# Patient Record
Sex: FEMALE | Race: WHITE | NOT HISPANIC OR LATINO | URBAN - METROPOLITAN AREA
[De-identification: names, ages, dates, MRNs, and addresses within clinical notes are randomized per-mention and may not be internally consistent; named-entity substitution may affect disease eponyms.]

---

## 2017-02-08 ENCOUNTER — EMERGENCY (EMERGENCY)
Facility: HOSPITAL | Age: 27
LOS: 1 days | Discharge: HOME | End: 2017-02-08
Attending: EMERGENCY MEDICINE | Admitting: EMERGENCY MEDICINE
Payer: COMMERCIAL

## 2017-02-08 VITALS
SYSTOLIC BLOOD PRESSURE: 115 MMHG | DIASTOLIC BLOOD PRESSURE: 80 MMHG | OXYGEN SATURATION: 100 % | RESPIRATION RATE: 16 BRPM | TEMPERATURE: 98 F | HEART RATE: 49 BPM

## 2017-02-08 VITALS
SYSTOLIC BLOOD PRESSURE: 118 MMHG | HEART RATE: 82 BPM | DIASTOLIC BLOOD PRESSURE: 81 MMHG | TEMPERATURE: 98 F | OXYGEN SATURATION: 100 % | RESPIRATION RATE: 22 BRPM

## 2017-02-08 LAB
ALBUMIN SERPL ELPH-MCNC: 5 G/DL — SIGNIFICANT CHANGE UP (ref 3.3–5)
ALP SERPL-CCNC: 76 U/L — SIGNIFICANT CHANGE UP (ref 40–120)
ALT FLD-CCNC: 21 U/L — SIGNIFICANT CHANGE UP (ref 4–33)
AST SERPL-CCNC: 22 U/L — SIGNIFICANT CHANGE UP (ref 4–32)
BASOPHILS # BLD AUTO: 0.02 K/UL — SIGNIFICANT CHANGE UP (ref 0–0.2)
BASOPHILS NFR BLD AUTO: 0.2 % — SIGNIFICANT CHANGE UP (ref 0–2)
BILIRUB SERPL-MCNC: 0.2 MG/DL — SIGNIFICANT CHANGE UP (ref 0.2–1.2)
BUN SERPL-MCNC: 7 MG/DL — SIGNIFICANT CHANGE UP (ref 7–23)
CALCIUM SERPL-MCNC: 9.8 MG/DL — SIGNIFICANT CHANGE UP (ref 8.4–10.5)
CHLORIDE SERPL-SCNC: 101 MMOL/L — SIGNIFICANT CHANGE UP (ref 98–107)
CO2 SERPL-SCNC: 25 MMOL/L — SIGNIFICANT CHANGE UP (ref 22–31)
CREAT SERPL-MCNC: 0.7 MG/DL — SIGNIFICANT CHANGE UP (ref 0.5–1.3)
D DIMER BLD IA.RAPID-MCNC: < 150 NG/ML — SIGNIFICANT CHANGE UP
EOSINOPHIL # BLD AUTO: 0.29 K/UL — SIGNIFICANT CHANGE UP (ref 0–0.5)
EOSINOPHIL NFR BLD AUTO: 2.9 % — SIGNIFICANT CHANGE UP (ref 0–6)
GLUCOSE SERPL-MCNC: 106 MG/DL — HIGH (ref 70–99)
HCT VFR BLD CALC: 39.9 % — SIGNIFICANT CHANGE UP (ref 34.5–45)
HGB BLD-MCNC: 13 G/DL — SIGNIFICANT CHANGE UP (ref 11.5–15.5)
IMM GRANULOCYTES NFR BLD AUTO: 0.1 % — SIGNIFICANT CHANGE UP (ref 0–1.5)
LYMPHOCYTES # BLD AUTO: 2.23 K/UL — SIGNIFICANT CHANGE UP (ref 1–3.3)
LYMPHOCYTES # BLD AUTO: 22.6 % — SIGNIFICANT CHANGE UP (ref 13–44)
MCHC RBC-ENTMCNC: 27 PG — SIGNIFICANT CHANGE UP (ref 27–34)
MCHC RBC-ENTMCNC: 32.6 % — SIGNIFICANT CHANGE UP (ref 32–36)
MCV RBC AUTO: 83 FL — SIGNIFICANT CHANGE UP (ref 80–100)
MONOCYTES # BLD AUTO: 0.67 K/UL — SIGNIFICANT CHANGE UP (ref 0–0.9)
MONOCYTES NFR BLD AUTO: 6.8 % — SIGNIFICANT CHANGE UP (ref 2–14)
NEUTROPHILS # BLD AUTO: 6.64 K/UL — SIGNIFICANT CHANGE UP (ref 1.8–7.4)
NEUTROPHILS NFR BLD AUTO: 67.4 % — SIGNIFICANT CHANGE UP (ref 43–77)
PLATELET # BLD AUTO: 303 K/UL — SIGNIFICANT CHANGE UP (ref 150–400)
PMV BLD: 11.8 FL — SIGNIFICANT CHANGE UP (ref 7–13)
POTASSIUM SERPL-MCNC: 3.8 MMOL/L — SIGNIFICANT CHANGE UP (ref 3.5–5.3)
POTASSIUM SERPL-SCNC: 3.8 MMOL/L — SIGNIFICANT CHANGE UP (ref 3.5–5.3)
PROT SERPL-MCNC: 8.3 G/DL — SIGNIFICANT CHANGE UP (ref 6–8.3)
RBC # BLD: 4.81 M/UL — SIGNIFICANT CHANGE UP (ref 3.8–5.2)
RBC # FLD: 13.4 % — SIGNIFICANT CHANGE UP (ref 10.3–14.5)
SODIUM SERPL-SCNC: 141 MMOL/L — SIGNIFICANT CHANGE UP (ref 135–145)
TROPONIN T SERPL-MCNC: < 0.06 NG/ML — SIGNIFICANT CHANGE UP (ref 0–0.06)
WBC # BLD: 9.86 K/UL — SIGNIFICANT CHANGE UP (ref 3.8–10.5)
WBC # FLD AUTO: 9.86 K/UL — SIGNIFICANT CHANGE UP (ref 3.8–10.5)

## 2017-02-08 PROCEDURE — 99284 EMERGENCY DEPT VISIT MOD MDM: CPT

## 2017-02-08 PROCEDURE — 71020: CPT | Mod: 26

## 2017-02-08 NOTE — ED PROVIDER NOTE - MUSCULOSKELETAL MINIMAL EXAM
pain is pleuritic on exam, appears to be worse with certain movements, no worse when lying completely flat

## 2017-02-08 NOTE — ED PROVIDER NOTE - VASCULAR COMPROMISE
nl pulses in carotids/UE/pulses full and equal bilaterally/no vascular compromise nl pulses in carotids/UE/LE/no vascular compromise/pulses full and equal bilaterally

## 2017-02-08 NOTE — ED PROVIDER NOTE - OBJECTIVE STATEMENT
25yo F with no significant PMHx, presents to ED c/o intermittent severe sharp CP, LUE tingling/soreness, SOB since yesterday night. Pt states she woke up with CP yesterday which then resolved and returned x3hrs ago while pt was at work. She notes pain was initially slight then worsened and is now radiating to her L chest. Pain is worse when straightening her back/breathing in/lying down/moving, alleviated when leaning forward. She took ASA and Tylenol for pain, ASA last taken a few hours ago. Pt was seen at [facility] and sent to ED. Pt states she has a stressful job. She had a cold x2wks ago with mild cough. Denies prior similar pain. LNMP x1wk ago, pt has regular periods, not on birth control. FHx of DM (brother). Denies other complaints, recent travels. NKDA.

## 2017-02-08 NOTE — ED ADULT TRIAGE NOTE - CHIEF COMPLAINT QUOTE
Pt arrives w/ c/o chest pain that occurred one time last night that began again this evening. Rpts pain increases with deep inspiration. Denies nausea or vomiting.

## 2017-02-08 NOTE — ED PROCEDURE NOTE - PROCEDURE ADDITIONAL DETAILS
no pericardial effusion  nl LV size and function  grossly nl RV size and function  no pericardial effusion seen    nl aortic span at base  no AI appreciated   fractional shortening= 29%   TAPSE= 23.6mm

## 2017-02-08 NOTE — ED ADULT NURSE NOTE - OBJECTIVE STATEMENT
Pt received as QUID pt per MD Rodriguez. Pt complaining of 7/10 sharp chest pain at rest worsening to 10/10 sharp chest pain on inspiration. Pt reports she had this pain last night, states 'it woke me up but then it went away'. Pt states the pain returned at around 6pm tonight and has not gone away. Pt reports pain is in the center of the chest radiating towards the L side and her L arm is tingling. Pt denies SOB. Pt appears restless. Pt states 'I have a really stressful job, maybe this is just an anxiety attack'. Skin warm, dry, intact, appropriate for race, no diaphoresis observed. EKG done showing NSR. VS documented per flow, 20g PIV placed in R AC, labs drawn and sent per orders. Medicated per orders, visitor at bedside. Safety maintained.

## 2017-02-08 NOTE — ED PROVIDER NOTE - PROGRESS NOTE DETAILS
pt improved with percocet  still present with deep breath lt sternal  pain also reproduced with palpation (point tender)     Pt now relates she had picked up heavy box shortly before pain began today   Pain remained in same spot  did not migrate to shoulder or back Cristopher Sanchez: Pt signed out to me by cristopher yepez at 2200. Plan to follow up labs and xray. all wnl. Pt feeling better and no pain at this time. Pt stable for discharge.

## 2017-02-08 NOTE — ED PROVIDER NOTE - MEDICAL DECISION MAKING DETAILS
Pt with new onset of L sided pleuritic CP, no cardiac risks, no PE risks by Hx. Pain is pleuritic possibly positional, difficulty to tell from exam. EKG is nondiagnostic, nonspecific T wave changes. Check CXR, labs, give pain control. Pt with new onset of L sided pleuritic CP, no cardiac risks, no PE risks by Hx. Pain is pleuritic possibly positional, difficulty to tell from exam. EKG is nondiagnostic, nonspecific T wave changes. not c/w ischemia Check CXR, labs, give pain control.

## 2017-02-08 NOTE — ED PROVIDER NOTE - NS ED MD SCRIBE ATTENDING SCRIBE SECTIONS
PAST MEDICAL/SURGICAL/SOCIAL HISTORY/HISTORY OF PRESENT ILLNESS/REVIEW OF SYSTEMS/PHYSICAL EXAM/HIV/VITAL SIGNS( Pullset)/DISPOSITION

## 2021-01-25 ENCOUNTER — APPOINTMENT (OUTPATIENT)
Dept: ANTEPARTUM | Facility: CLINIC | Age: 31
End: 2021-01-25
Payer: MEDICAID

## 2021-01-25 ENCOUNTER — ASOB RESULT (OUTPATIENT)
Age: 31
End: 2021-01-25

## 2021-01-25 PROBLEM — Z00.00 ENCOUNTER FOR PREVENTIVE HEALTH EXAMINATION: Status: ACTIVE | Noted: 2021-01-25

## 2021-01-25 PROCEDURE — 99072 ADDL SUPL MATRL&STAF TM PHE: CPT

## 2021-01-25 PROCEDURE — 76805 OB US >/= 14 WKS SNGL FETUS: CPT

## 2021-01-25 PROCEDURE — 99241 OFFICE CONSULTATION NEW/ESTAB PATIENT 15 MIN: CPT | Mod: 25

## 2021-02-02 ENCOUNTER — ASOB RESULT (OUTPATIENT)
Age: 31
End: 2021-02-02

## 2021-02-02 ENCOUNTER — APPOINTMENT (OUTPATIENT)
Dept: ANTEPARTUM | Facility: CLINIC | Age: 31
End: 2021-02-02
Payer: MEDICAID

## 2021-02-02 PROCEDURE — 76816 OB US FOLLOW-UP PER FETUS: CPT

## 2021-02-02 PROCEDURE — 99072 ADDL SUPL MATRL&STAF TM PHE: CPT

## 2021-03-16 ENCOUNTER — APPOINTMENT (OUTPATIENT)
Dept: ANTEPARTUM | Facility: CLINIC | Age: 31
End: 2021-03-16

## 2021-06-06 ENCOUNTER — OUTPATIENT (OUTPATIENT)
Dept: OUTPATIENT SERVICES | Facility: HOSPITAL | Age: 31
LOS: 1 days | End: 2021-06-06
Payer: COMMERCIAL

## 2021-06-06 DIAGNOSIS — Z11.52 ENCOUNTER FOR SCREENING FOR COVID-19: ICD-10-CM

## 2021-06-06 LAB — SARS-COV-2 RNA SPEC QL NAA+PROBE: SIGNIFICANT CHANGE UP

## 2021-06-06 PROCEDURE — U0003: CPT

## 2021-06-06 PROCEDURE — U0005: CPT

## 2021-06-06 PROCEDURE — C9803: CPT

## 2021-06-09 ENCOUNTER — TRANSCRIPTION ENCOUNTER (OUTPATIENT)
Age: 31
End: 2021-06-09

## 2021-06-09 ENCOUNTER — INPATIENT (INPATIENT)
Facility: HOSPITAL | Age: 31
LOS: 2 days | Discharge: HOME | End: 2021-06-12
Attending: OBSTETRICS & GYNECOLOGY | Admitting: OBSTETRICS & GYNECOLOGY
Payer: COMMERCIAL

## 2021-06-09 VITALS
SYSTOLIC BLOOD PRESSURE: 114 MMHG | RESPIRATION RATE: 16 BRPM | HEART RATE: 100 BPM | TEMPERATURE: 99 F | DIASTOLIC BLOOD PRESSURE: 71 MMHG

## 2021-06-09 DIAGNOSIS — O99.513 DISEASES OF THE RESPIRATORY SYSTEM COMPLICATING PREGNANCY, THIRD TRIMESTER: ICD-10-CM

## 2021-06-09 DIAGNOSIS — O35.8XX4: ICD-10-CM

## 2021-06-09 LAB
BASOPHILS # BLD AUTO: 0.04 K/UL — SIGNIFICANT CHANGE UP (ref 0–0.2)
BASOPHILS NFR BLD AUTO: 0.4 % — SIGNIFICANT CHANGE UP (ref 0–2)
BLD GP AB SCN SERPL QL: NEGATIVE — SIGNIFICANT CHANGE UP
COVID-19 SPIKE DOMAIN AB INTERP: POSITIVE
COVID-19 SPIKE DOMAIN ANTIBODY RESULT: 47.4 U/ML — HIGH
EOSINOPHIL # BLD AUTO: 0.14 K/UL — SIGNIFICANT CHANGE UP (ref 0–0.5)
EOSINOPHIL NFR BLD AUTO: 1.2 % — SIGNIFICANT CHANGE UP (ref 0–6)
HCT VFR BLD CALC: 34.9 % — SIGNIFICANT CHANGE UP (ref 34.5–45)
HGB BLD-MCNC: 11.4 G/DL — LOW (ref 11.5–15.5)
IMM GRANULOCYTES NFR BLD AUTO: 1.3 % — SIGNIFICANT CHANGE UP (ref 0–1.5)
LYMPHOCYTES # BLD AUTO: 1.56 K/UL — SIGNIFICANT CHANGE UP (ref 1–3.3)
LYMPHOCYTES # BLD AUTO: 13.7 % — SIGNIFICANT CHANGE UP (ref 13–44)
MCHC RBC-ENTMCNC: 28.9 PG — SIGNIFICANT CHANGE UP (ref 27–34)
MCHC RBC-ENTMCNC: 32.7 GM/DL — SIGNIFICANT CHANGE UP (ref 32–36)
MCV RBC AUTO: 88.6 FL — SIGNIFICANT CHANGE UP (ref 80–100)
MONOCYTES # BLD AUTO: 0.72 K/UL — SIGNIFICANT CHANGE UP (ref 0–0.9)
MONOCYTES NFR BLD AUTO: 6.3 % — SIGNIFICANT CHANGE UP (ref 2–14)
NEUTROPHILS # BLD AUTO: 8.77 K/UL — HIGH (ref 1.8–7.4)
NEUTROPHILS NFR BLD AUTO: 77.1 % — HIGH (ref 43–77)
NRBC # BLD: 0 /100 WBCS — SIGNIFICANT CHANGE UP (ref 0–0)
PLATELET # BLD AUTO: 225 K/UL — SIGNIFICANT CHANGE UP (ref 150–400)
RBC # BLD: 3.94 M/UL — SIGNIFICANT CHANGE UP (ref 3.8–5.2)
RBC # FLD: 14.1 % — SIGNIFICANT CHANGE UP (ref 10.3–14.5)
RH IG SCN BLD-IMP: POSITIVE — SIGNIFICANT CHANGE UP
RH IG SCN BLD-IMP: POSITIVE — SIGNIFICANT CHANGE UP
SARS-COV-2 IGG+IGM SERPL QL IA: 47.4 U/ML — HIGH
SARS-COV-2 IGG+IGM SERPL QL IA: POSITIVE
T PALLIDUM AB TITR SER: NEGATIVE — SIGNIFICANT CHANGE UP
WBC # BLD: 11.38 K/UL — HIGH (ref 3.8–10.5)
WBC # FLD AUTO: 11.38 K/UL — HIGH (ref 3.8–10.5)

## 2021-06-09 RX ORDER — SODIUM CHLORIDE 9 MG/ML
1000 INJECTION, SOLUTION INTRAVENOUS
Refills: 0 | Status: DISCONTINUED | OUTPATIENT
Start: 2021-06-09 | End: 2021-06-10

## 2021-06-09 RX ORDER — OXYTOCIN 10 UNIT/ML
4 VIAL (ML) INJECTION
Qty: 30 | Refills: 0 | Status: DISCONTINUED | OUTPATIENT
Start: 2021-06-09 | End: 2021-06-10

## 2021-06-09 RX ORDER — OXYTOCIN 10 UNIT/ML
333.33 VIAL (ML) INJECTION
Qty: 20 | Refills: 0 | Status: DISCONTINUED | OUTPATIENT
Start: 2021-06-09 | End: 2021-06-10

## 2021-06-09 RX ORDER — CITRIC ACID/SODIUM CITRATE 300-500 MG
15 SOLUTION, ORAL ORAL EVERY 6 HOURS
Refills: 0 | Status: DISCONTINUED | OUTPATIENT
Start: 2021-06-09 | End: 2021-06-10

## 2021-06-09 RX ADMIN — Medication 4 MILLIUNIT(S)/MIN: at 15:37

## 2021-06-09 NOTE — OB PROVIDER H&P - HISTORY OF PRESENT ILLNESS
29 yo  @ 39.4 weeks presenting for elective induction of labor. pt denies reg cxns/lof/vb. pt reports pos fm. pnc uncomplicated. gbs neg efw by leopold [id number]  allergies- nkda  meds- PNV, fe  med hx- anemia.  allergy/exercise induced asthma. last exacerbation > 2 years ago. pt denies hospitalizations/intubations  pt denies cardiac/gi/neuro/psych  obhx- current no pnc complications.   gyn hx- denies cysts/fibroids/abnl pap/std/hpv  sx hx- denies  fam hx- brother DM, mother bt ca > 50 years old when diagnosed  social hx- etoh 2-3/week. pt denies etoh/tob/illicit drug use during pregnancy

## 2021-06-09 NOTE — OB PROVIDER H&P - ALERT: PERTINENT HISTORY
The patient was playing a game. The  just stopped by to say hello. Chaplains will remain available to offer spiritual and emotional support as needed. None

## 2021-06-09 NOTE — CHART NOTE - NSCHARTNOTEFT_GEN_A_CORE
Pt seen for placement of vag cyto  T(C): 37 (06-09-21 @ 06:50), Max: 37.0 (06-09-21 @ 06:14)  HR: 97 (06-09-21 @ 08:37) (93 - 108)  BP: 109/76 (06-09-21 @ 08:31) (109/76 - 114/71)  RR: 16 (06-09-21 @ 06:50) (16 - 16)  SpO2: 98% (06-09-21 @ 08:37) (97% - 100%)  EFM cat 1  TOCO no ctx  VE 0/50/-3  P0@39w4d, elective IOL  vag cyto 25 placed in posterior fornix  Ame Sweeney PAC

## 2021-06-09 NOTE — OB PROVIDER H&P - NSHPPHYSICALEXAM_GEN_ALL_CORE
pe  Vital Signs Last 24 Hrs  T(C): 37 (09 Jun 2021 06:50), Max: 37.0 (09 Jun 2021 06:14)  T(F): 98.6 (09 Jun 2021 06:50), Max: 98.6 (09 Jun 2021 06:14)  HR: 105 (09 Jun 2021 07:32) (99 - 108)  BP: 114/71 (09 Jun 2021 06:50) (114/71 - 114/71)  BP(mean): --  RR: 16 (09 Jun 2021 06:50) (16 - 16)  SpO2: 98% (09 Jun 2021 07:32) (98% - 100%)    gen- a&ox3 well developed well no urished nad  cv- rrr s1 s2 lungs cta bl  abd gravid soft  ve 0/0/-3 int int  bedside sono confirmed cephalic

## 2021-06-09 NOTE — OB PROVIDER H&P - PROBLEM SELECTOR PLAN 1
admit to L&D  iv fluids  routine labs   npo bictra  efm toco  anesthesia consult  bedside sono confirmed cephalic  cytotec PV for induction  anticipated   Bird Chin

## 2021-06-09 NOTE — OB RN DELIVERY SUMMARY - NS_SEPSISRSKCALC_OBGYN_ALL_OB_FT
EOS calculated successfully. EOS Risk Factor: 0.5/1000 live births ([facility] national incidence); GA=39w5d; Temp=98.6; ROM=5.983; GBS='Unknown'; Antibiotics='No antibiotics or any antibiotics < 2 hrs prior to birth'

## 2021-06-09 NOTE — OB RN DELIVERY SUMMARY - NSSELHIDDEN_OBGYN_ALL_OB_FT
[NS_DeliveryAttending1_OBGYN_ALL_OB_FT:[id number]],[NS_DeliveryAssist1_OBGYN_ALL_OB_FT:[id number]],[NS_DeliveryRN_OBGYN_ALL_OB_FT:[id number]=]

## 2021-06-09 NOTE — PRE-ANESTHESIA EVALUATION ADULT - NSANTHOSAYNRD_GEN_A_CORE
No. ROGELIO screening performed.  STOP BANG Legend: 0-2 = LOW Risk; 3-4 = INTERMEDIATE Risk; 5-8 = HIGH Risk

## 2021-06-10 RX ORDER — HEPARIN SODIUM 5000 [USP'U]/ML
5000 INJECTION INTRAVENOUS; SUBCUTANEOUS EVERY 12 HOURS
Refills: 0 | Status: DISCONTINUED | OUTPATIENT
Start: 2021-06-10 | End: 2021-06-12

## 2021-06-10 RX ORDER — DIPHENHYDRAMINE HCL 50 MG
25 CAPSULE ORAL EVERY 6 HOURS
Refills: 0 | Status: DISCONTINUED | OUTPATIENT
Start: 2021-06-10 | End: 2021-06-12

## 2021-06-10 RX ORDER — KETOROLAC TROMETHAMINE 30 MG/ML
30 SYRINGE (ML) INJECTION EVERY 6 HOURS
Refills: 0 | Status: DISCONTINUED | OUTPATIENT
Start: 2021-06-10 | End: 2021-06-10

## 2021-06-10 RX ORDER — IBUPROFEN 200 MG
600 TABLET ORAL EVERY 6 HOURS
Refills: 0 | Status: DISCONTINUED | OUTPATIENT
Start: 2021-06-10 | End: 2021-06-12

## 2021-06-10 RX ORDER — MORPHINE SULFATE 50 MG/1
2 CAPSULE, EXTENDED RELEASE ORAL ONCE
Refills: 0 | Status: DISCONTINUED | OUTPATIENT
Start: 2021-06-10 | End: 2021-06-11

## 2021-06-10 RX ORDER — OXYCODONE HYDROCHLORIDE 5 MG/1
5 TABLET ORAL
Refills: 0 | Status: DISCONTINUED | OUTPATIENT
Start: 2021-06-10 | End: 2021-06-12

## 2021-06-10 RX ORDER — IBUPROFEN 200 MG
600 TABLET ORAL EVERY 6 HOURS
Refills: 0 | Status: COMPLETED | OUTPATIENT
Start: 2021-06-10 | End: 2022-05-09

## 2021-06-10 RX ORDER — SODIUM CHLORIDE 9 MG/ML
1000 INJECTION, SOLUTION INTRAVENOUS
Refills: 0 | Status: DISCONTINUED | OUTPATIENT
Start: 2021-06-10 | End: 2021-06-10

## 2021-06-10 RX ORDER — OXYTOCIN 10 UNIT/ML
333.33 VIAL (ML) INJECTION
Qty: 20 | Refills: 0 | Status: DISCONTINUED | OUTPATIENT
Start: 2021-06-10 | End: 2021-06-12

## 2021-06-10 RX ORDER — TETANUS TOXOID, REDUCED DIPHTHERIA TOXOID AND ACELLULAR PERTUSSIS VACCINE, ADSORBED 5; 2.5; 8; 8; 2.5 [IU]/.5ML; [IU]/.5ML; UG/.5ML; UG/.5ML; UG/.5ML
0.5 SUSPENSION INTRAMUSCULAR ONCE
Refills: 0 | Status: DISCONTINUED | OUTPATIENT
Start: 2021-06-10 | End: 2021-06-12

## 2021-06-10 RX ORDER — ACETAMINOPHEN 500 MG
975 TABLET ORAL
Refills: 0 | Status: DISCONTINUED | OUTPATIENT
Start: 2021-06-10 | End: 2021-06-10

## 2021-06-10 RX ORDER — OXYCODONE HYDROCHLORIDE 5 MG/1
5 TABLET ORAL ONCE
Refills: 0 | Status: DISCONTINUED | OUTPATIENT
Start: 2021-06-10 | End: 2021-06-12

## 2021-06-10 RX ORDER — OXYTOCIN 10 UNIT/ML
333.33 VIAL (ML) INJECTION
Qty: 20 | Refills: 0 | Status: DISCONTINUED | OUTPATIENT
Start: 2021-06-10 | End: 2021-06-10

## 2021-06-10 RX ORDER — NALOXONE HYDROCHLORIDE 4 MG/.1ML
0.1 SPRAY NASAL
Refills: 0 | Status: DISCONTINUED | OUTPATIENT
Start: 2021-06-10 | End: 2021-06-12

## 2021-06-10 RX ORDER — DEXAMETHASONE 0.5 MG/5ML
4 ELIXIR ORAL EVERY 6 HOURS
Refills: 0 | Status: DISCONTINUED | OUTPATIENT
Start: 2021-06-10 | End: 2021-06-12

## 2021-06-10 RX ORDER — SIMETHICONE 80 MG/1
80 TABLET, CHEWABLE ORAL EVERY 4 HOURS
Refills: 0 | Status: DISCONTINUED | OUTPATIENT
Start: 2021-06-10 | End: 2021-06-10

## 2021-06-10 RX ORDER — TETANUS TOXOID, REDUCED DIPHTHERIA TOXOID AND ACELLULAR PERTUSSIS VACCINE, ADSORBED 5; 2.5; 8; 8; 2.5 [IU]/.5ML; [IU]/.5ML; UG/.5ML; UG/.5ML; UG/.5ML
0.5 SUSPENSION INTRAMUSCULAR ONCE
Refills: 0 | Status: COMPLETED | OUTPATIENT
Start: 2021-06-10

## 2021-06-10 RX ORDER — SODIUM CHLORIDE 9 MG/ML
1000 INJECTION, SOLUTION INTRAVENOUS
Refills: 0 | Status: DISCONTINUED | OUTPATIENT
Start: 2021-06-10 | End: 2021-06-12

## 2021-06-10 RX ORDER — ACETAMINOPHEN 500 MG
975 TABLET ORAL
Refills: 0 | Status: DISCONTINUED | OUTPATIENT
Start: 2021-06-10 | End: 2021-06-12

## 2021-06-10 RX ORDER — ONDANSETRON 8 MG/1
4 TABLET, FILM COATED ORAL EVERY 6 HOURS
Refills: 0 | Status: DISCONTINUED | OUTPATIENT
Start: 2021-06-10 | End: 2021-06-12

## 2021-06-10 RX ORDER — LANOLIN
1 OINTMENT (GRAM) TOPICAL EVERY 6 HOURS
Refills: 0 | Status: DISCONTINUED | OUTPATIENT
Start: 2021-06-10 | End: 2021-06-12

## 2021-06-10 RX ORDER — MAGNESIUM HYDROXIDE 400 MG/1
30 TABLET, CHEWABLE ORAL
Refills: 0 | Status: DISCONTINUED | OUTPATIENT
Start: 2021-06-10 | End: 2021-06-12

## 2021-06-10 RX ORDER — MAGNESIUM HYDROXIDE 400 MG/1
30 TABLET, CHEWABLE ORAL
Refills: 0 | Status: DISCONTINUED | OUTPATIENT
Start: 2021-06-10 | End: 2021-06-10

## 2021-06-10 RX ORDER — LANOLIN
1 OINTMENT (GRAM) TOPICAL EVERY 6 HOURS
Refills: 0 | Status: DISCONTINUED | OUTPATIENT
Start: 2021-06-10 | End: 2021-06-10

## 2021-06-10 RX ORDER — SIMETHICONE 80 MG/1
80 TABLET, CHEWABLE ORAL EVERY 4 HOURS
Refills: 0 | Status: DISCONTINUED | OUTPATIENT
Start: 2021-06-10 | End: 2021-06-12

## 2021-06-10 RX ADMIN — Medication 975 MILLIGRAM(S): at 19:01

## 2021-06-10 RX ADMIN — Medication 30 MILLIGRAM(S): at 21:56

## 2021-06-10 RX ADMIN — Medication 30 MILLIGRAM(S): at 10:20

## 2021-06-10 RX ADMIN — Medication 30 MILLIGRAM(S): at 16:25

## 2021-06-10 RX ADMIN — Medication 30 MILLIGRAM(S): at 10:02

## 2021-06-10 RX ADMIN — Medication 30 MILLIGRAM(S): at 22:11

## 2021-06-10 RX ADMIN — Medication 975 MILLIGRAM(S): at 18:27

## 2021-06-10 RX ADMIN — Medication 30 MILLIGRAM(S): at 16:06

## 2021-06-10 RX ADMIN — Medication 975 MILLIGRAM(S): at 08:00

## 2021-06-10 RX ADMIN — Medication 975 MILLIGRAM(S): at 07:10

## 2021-06-10 RX ADMIN — HEPARIN SODIUM 5000 UNIT(S): 5000 INJECTION INTRAVENOUS; SUBCUTANEOUS at 10:02

## 2021-06-10 RX ADMIN — Medication 15 MILLILITER(S): at 02:14

## 2021-06-10 RX ADMIN — Medication 975 MILLIGRAM(S): at 14:00

## 2021-06-10 RX ADMIN — Medication 975 MILLIGRAM(S): at 13:26

## 2021-06-10 RX ADMIN — HEPARIN SODIUM 5000 UNIT(S): 5000 INJECTION INTRAVENOUS; SUBCUTANEOUS at 21:56

## 2021-06-10 NOTE — OB RN INTRAOPERATIVE NOTE - NSSELHIDDEN_OBGYN_ALL_OB_FT
[NS_DeliveryAttending1_OBGYN_ALL_OB_FT:[id number]],[NS_DeliveryAssist1_OBGYN_ALL_OB_FT:[id number]],[NS_DeliveryRN_OBGYN_ALL_OB_FT:[id number]=]

## 2021-06-10 NOTE — OB PROVIDER LABOR PROGRESS NOTE - ASSESSMENT
A/P:   - Labor: elective IOL. Sp VC and CB. On Pitocin, head coming down though no further dilation. Will re-examine in 2 hours to assess progress.  - Fetus: Cat 1  - GBS: neg  - Pain: Epi in situ    Jovita Diop, PGY-1  d/w Dr Chin   
[facility]
- for epi  - c/w val Chavez PGY3  d/w Dr. Chin
cont efm toco  cont current mgmt    Dw Dr Chin  
AROM: CLEAR  - CONTINUE PRESENT MANAGEMENT
ARREST OF DILATATION, AS WELL AS RISKS/BENEFITS, INDICATIONS AND ALTERNATIVES TO C SECTION D/W PT AND CLEARLY UNDERSTOOD. QUESTIONS ANSWERED AND INFORMED CONSENT OBTAINED.  NURSING/ANESTHESIA AWARE.   WILL PROCEED W C SECTION. 
cont efm toco  cont current mgmt  for second dose of cytotec PV 25mcg  plan per and DW Dr Chin
A/P:   - Labor: eIOL. Pt on Pit and not making change. Pt requesting  section at this time due to significant discomfort. Pt unchanged since 9pm. Per Dr Chin, will stop Pitocin and plan for . Charge and Anesthesia aware.     Jovita Diop, PGY-1  d/w Dr Chin

## 2021-06-10 NOTE — OB PROVIDER DELIVERY SUMMARY - NSSELHIDDEN_OBGYN_ALL_OB_FT
[NS_DeliveryAttending1_OBGYN_ALL_OB_FT:[id number]],[NS_DeliveryAssist1_OBGYN_ALL_OB_FT:[id number]],[NS_DeliveryRN_OBGYN_ALL_OB_FT:[id number]=]

## 2021-06-10 NOTE — OB NEONATOLOGY/PEDIATRICIAN DELIVERY SUMMARY - NSPEDSNEONOTESA_OBGYN_ALL_OB_FT
Baby Carolynn is a 39.5 wk GA F born to a 29 yo  mother via C/S for failure to progress. Maternal history of anemia and allergy induced asthma. Prenatal history uncomplicated. Maternal BT O+. PNL neg, NR, and immune. GBS NEG on . AROM at  on , clear fluids. Baby born vigorous and crying spontaneously. WDSS. APGARS . EOS 0.11. Maternal Tmax 37. Mom plans to breastfeed, would like hepatitis B vaccine. Mother is COVID negative.

## 2021-06-10 NOTE — OB PROVIDER LABOR PROGRESS NOTE - NS_OBIHIFHRDETAILS_OBGYN_ALL_OB_FT
FHR REASSURING AND CATEGORY I.
120/mod/+acels
FHR REASSURING AND CATEGORY I.
145/mod/+acels
125, mod marimar, + accels, no decels

## 2021-06-10 NOTE — OB PROVIDER LABOR PROGRESS NOTE - NSVAGINALEXAM_OBGYN_ALL_OB_DT
09-Jun-2021 20:58
09-Jun-2021 23:16
09-Jun-2021 11:25
09-Jun-2021 18:30
10-Jace-2021 00:56
10-Jace-2021 02:13

## 2021-06-10 NOTE — OB PROVIDER LABOR PROGRESS NOTE - NS_SUBJECTIVE/OBJECTIVE_OBGYN_ALL_OB_FT
PA Note  pt seen and examined for cont of mgmt  pt resting in bed denies pain  cervical balloon placed without incident. pt tolerated placement well. 60cc ns placed in uterine and vaginal balloon    ve 1/50/-2 ant soft
PA note  pt seen and examined for cont of mgmt  pt resting in bed denies pain
R1 OB Labor Note    S: Patient seen and examined at bedside.     T(C): 36.9 (06-09-21 @ 23:16), Max: 37.0 (06-09-21 @ 06:14)  HR: 86 (06-10-21 @ 00:54) (70 - 113)  BP: 107/58 (06-10-21 @ 00:45) (90/51 - 126/61)  BP(mean): --  ABP: --  ABP(mean): --  RR: 18 (06-09-21 @ 18:40) (16 - 18)  SpO2: 100% (06-10-21 @ 00:54) (92% - 100%)  Wt(kg): --  CVP(mm Hg): --  CI: --  CAPILLARY BLOOD GLUCOSE       N/A      06-09 @ 07:01  -  06-10 @ 00:55  --------------------------------------------------------  IN:    dextrose 5% + lactated ringers: 1000 mL    Lactated Ringers: 1000 mL    Oral Fluid: 330 mL  Total IN: 2330 mL    OUT:    Voided (mL): 1100 mL  Total OUT: 1100 mL    Total NET: 1230 mL
PA Note  pt seen and examined for cont of mgmt  pt resting in bed denies pain  cytotec PV 25mcg #2 placed without incident. pt tolerated placement well.    0.5/60/-3
PT COMFORTABLE W/ EPIDURAL.
PT COMFORTABLE, BUT REQUESTING C SECTION DUE TO ARREST OF PROGRESS.
R1 OB Labor Note    S: Patient seen and examined at bedside.     T(C): 36.8 (06-09-21 @ 21:02), Max: 37.0 (06-09-21 @ 06:14)  HR: 76 (06-09-21 @ 23:09) (70 - 113)  BP: 107/62 (06-09-21 @ 23:15) (90/51 - 126/61)  BP(mean): --  ABP: --  ABP(mean): --  RR: 18 (06-09-21 @ 18:40) (16 - 18)  SpO2: 98% (06-09-21 @ 23:09) (95% - 100%)  Wt(kg): --  CVP(mm Hg): --  CI: --  CAPILLARY BLOOD GLUCOSE       N/A      06-09 @ 07:01  -  06-09 @ 23:16  --------------------------------------------------------  IN:    dextrose 5% + lactated ringers: 1000 mL    Lactated Ringers: 1000 mL    Oral Fluid: 330 mL  Total IN: 2330 mL    OUT:    Voided (mL): 1100 mL  Total OUT: 1100 mL    Total NET: 1230 mL
R3 Labor Note    went to assess patient for cervical change as patient requesting epidural    T(C): 36.9 (06-09-21 @ 17:15), Max: 37.0 (06-09-21 @ 06:14)  HR: 87 (06-09-21 @ 18:07) (82 - 113)  BP: 101/58 (06-09-21 @ 17:15) (90/51 - 120/64)  RR: 18 (06-09-21 @ 17:15) (16 - 18)  SpO2: 97% (06-09-21 @ 18:07) (96% - 100%)

## 2021-06-10 NOTE — PROGRESS NOTE ADULT - SUBJECTIVE AND OBJECTIVE BOX
Day 0 of Anesthesia Pain Management Service    SUBJECTIVE: Doing ok  Pain Scale Score:          [X] Refer to charted pain scores    THERAPY:  s/p   2  mg PF epidural morphine on 6\10\2021      MEDICATIONS  (STANDING):  acetaminophen   Tablet .. 975 milliGRAM(s) Oral <User Schedule>  diphtheria/tetanus/pertussis (acellular) Vaccine (ADAcel) 0.5 milliLiter(s) IntraMuscular once  diphtheria/tetanus/pertussis (acellular) Vaccine (ADAcel) 0.5 milliLiter(s) IntraMuscular once  heparin   Injectable 5000 Unit(s) SubCutaneous every 12 hours  ibuprofen  Tablet. 600 milliGRAM(s) Oral every 6 hours  ibuprofen  Tablet. 600 milliGRAM(s) Oral every 6 hours  ketorolac   Injectable 30 milliGRAM(s) IV Push every 6 hours  lactated ringers. 1000 milliLiter(s) (125 mL/Hr) IV Continuous <Continuous>  morphine PF Epidural 2 milliGRAM(s) Epidural once  oxytocin Infusion 333.333 milliUNIT(s)/Min (1000 mL/Hr) IV Continuous <Continuous>    MEDICATIONS  (PRN):  dexAMETHasone  Injectable 4 milliGRAM(s) IV Push every 6 hours PRN Nausea  diphenhydrAMINE 25 milliGRAM(s) Oral every 6 hours PRN Pruritus  diphenhydrAMINE 25 milliGRAM(s) Oral every 6 hours PRN Pruritus  lanolin Ointment 1 Application(s) Topical every 6 hours PRN Sore Nipples  magnesium hydroxide Suspension 30 milliLiter(s) Oral two times a day PRN Constipation  naloxone Injectable 0.1 milliGRAM(s) IV Push every 3 minutes PRN For ANY of the following changes in patient status:  A. Breaths Per Minute LESS THAN 10, B. Oxygen saturation LESS THAN 90%, C. Sedation score of 6 for Stop After: 4 Times  ondansetron Injectable 4 milliGRAM(s) IV Push every 6 hours PRN Nausea  oxyCODONE    IR 5 milliGRAM(s) Oral every 3 hours PRN Moderate to Severe Pain (4-10)  oxyCODONE    IR 5 milliGRAM(s) Oral once PRN Moderate to Severe Pain (4-10)  oxyCODONE    IR 5 milliGRAM(s) Oral every 3 hours PRN Moderate to Severe Pain (4-10)  oxyCODONE    IR 5 milliGRAM(s) Oral once PRN Moderate to Severe Pain (4-10)  simethicone 80 milliGRAM(s) Chew every 4 hours PRN Gas      OBJECTIVE:    Sedation:        	[X] Alert	 [ ] Drowsy	[ ] Arousable      [ ] Asleep       [ ] Unresponsive    Side Effects:	[X] None 	[ ] Nausea	[ ] Vomiting         [ ] Pruritus  		[ ] Weakness            [ ] Numbness	          [ ] Other:    Vital Signs Last 24 Hrs  T(C): 36.8 (10 Jace 2021 06:29), Max: 36.9 (09 Jun 2021 11:05)  T(F): 98.2 (10 Jace 2021 06:29), Max: 98.42 (09 Jun 2021 11:05)  HR: 67 (10 Jace 2021 06:29) (67 - 113)  BP: 111/63 (10 Jace 2021 06:29) (90/51 - 140/60)  BP(mean): 79 (10 Jace 2021 06:29) (70 - 86)  RR: 18 (10 Jace 2021 06:29) (16 - 18)  SpO2: 95% (10 Jace 2021 06:29) (92% - 100%)    ASSESSMENT/ PLAN  [X] Patient to be transitioned to prn analgesics after 24 hours  [X] Pain management per primary service, pain service to sign off   [X]Documentation and Verification of current medications

## 2021-06-10 NOTE — PROGRESS NOTE ADULT - SUBJECTIVE AND OBJECTIVE BOX
Day 1 of Anesthesia Pain Management Service    SUBJECTIVE:  Pain Scale Score:          [X] Refer to charted pain scores    THERAPY: Received 2 mg PF epidural morphine as above    OBJECTIVE:    Sedation:        	[X] Alert	[ ] Drowsy	[ ] Arousable      [ ] Asleep       [ ] Unresponsive    Side Effects:	[X] None	[ ] Nausea	[ ] Vomiting         [ ] Pruritus  		[ ] Weakness            [ ] Numbness	          [ ] Other:    ASSESSMENT/ PLAN  [X] Patient transitioned to prn analgesics  [X] Pain management per primary service, pain service to sign off   [X]Documentation and Verification of current medications

## 2021-06-10 NOTE — OB PROVIDER DELIVERY SUMMARY - NSPROVIDERDELIVERYNOTE_OBGYN_ALL_OB_FT
primary LTCS, uncomplicated  viable female infant, vertex presentation, Apgars 9/9  Grossly normal fallopian tubes, uterus, and ovaries  Uterus closed in 1 layer  Azithromycin 500mg and Ancef 2g given     EBL: 800  IVF: 1600  UOP: 125

## 2021-06-11 LAB
BASOPHILS # BLD AUTO: 0.03 K/UL — SIGNIFICANT CHANGE UP (ref 0–0.2)
BASOPHILS NFR BLD AUTO: 0.2 % — SIGNIFICANT CHANGE UP (ref 0–2)
EOSINOPHIL # BLD AUTO: 0.18 K/UL — SIGNIFICANT CHANGE UP (ref 0–0.5)
EOSINOPHIL NFR BLD AUTO: 1.2 % — SIGNIFICANT CHANGE UP (ref 0–6)
HCT VFR BLD CALC: 30.2 % — LOW (ref 34.5–45)
HGB BLD-MCNC: 10 G/DL — LOW (ref 11.5–15.5)
IMM GRANULOCYTES NFR BLD AUTO: 0.6 % — SIGNIFICANT CHANGE UP (ref 0–1.5)
LYMPHOCYTES # BLD AUTO: 13.5 % — SIGNIFICANT CHANGE UP (ref 13–44)
LYMPHOCYTES # BLD AUTO: 2.02 K/UL — SIGNIFICANT CHANGE UP (ref 1–3.3)
MCHC RBC-ENTMCNC: 30 PG — SIGNIFICANT CHANGE UP (ref 27–34)
MCHC RBC-ENTMCNC: 33.1 GM/DL — SIGNIFICANT CHANGE UP (ref 32–36)
MCV RBC AUTO: 90.7 FL — SIGNIFICANT CHANGE UP (ref 80–100)
MONOCYTES # BLD AUTO: 0.87 K/UL — SIGNIFICANT CHANGE UP (ref 0–0.9)
MONOCYTES NFR BLD AUTO: 5.8 % — SIGNIFICANT CHANGE UP (ref 2–14)
NEUTROPHILS # BLD AUTO: 11.75 K/UL — HIGH (ref 1.8–7.4)
NEUTROPHILS NFR BLD AUTO: 78.7 % — HIGH (ref 43–77)
NRBC # BLD: 0 /100 WBCS — SIGNIFICANT CHANGE UP (ref 0–0)
PLATELET # BLD AUTO: 194 K/UL — SIGNIFICANT CHANGE UP (ref 150–400)
RBC # BLD: 3.33 M/UL — LOW (ref 3.8–5.2)
RBC # FLD: 14.2 % — SIGNIFICANT CHANGE UP (ref 10.3–14.5)
WBC # BLD: 14.94 K/UL — HIGH (ref 3.8–10.5)
WBC # FLD AUTO: 14.94 K/UL — HIGH (ref 3.8–10.5)

## 2021-06-11 RX ORDER — TETANUS TOXOID, REDUCED DIPHTHERIA TOXOID AND ACELLULAR PERTUSSIS VACCINE, ADSORBED 5; 2.5; 8; 8; 2.5 [IU]/.5ML; [IU]/.5ML; UG/.5ML; UG/.5ML; UG/.5ML
0.5 SUSPENSION INTRAMUSCULAR ONCE
Refills: 0 | Status: COMPLETED | OUTPATIENT
Start: 2021-06-11 | End: 2021-06-11

## 2021-06-11 RX ADMIN — Medication 975 MILLIGRAM(S): at 12:20

## 2021-06-11 RX ADMIN — Medication 975 MILLIGRAM(S): at 06:13

## 2021-06-11 RX ADMIN — Medication 600 MILLIGRAM(S): at 22:38

## 2021-06-11 RX ADMIN — Medication 975 MILLIGRAM(S): at 18:04

## 2021-06-11 RX ADMIN — Medication 600 MILLIGRAM(S): at 09:30

## 2021-06-11 RX ADMIN — HEPARIN SODIUM 5000 UNIT(S): 5000 INJECTION INTRAVENOUS; SUBCUTANEOUS at 10:46

## 2021-06-11 RX ADMIN — Medication 975 MILLIGRAM(S): at 05:35

## 2021-06-11 RX ADMIN — Medication 975 MILLIGRAM(S): at 01:05

## 2021-06-11 RX ADMIN — Medication 600 MILLIGRAM(S): at 04:30

## 2021-06-11 RX ADMIN — Medication 600 MILLIGRAM(S): at 09:11

## 2021-06-11 RX ADMIN — Medication 600 MILLIGRAM(S): at 15:10

## 2021-06-11 RX ADMIN — Medication 975 MILLIGRAM(S): at 12:30

## 2021-06-11 RX ADMIN — Medication 600 MILLIGRAM(S): at 15:30

## 2021-06-11 RX ADMIN — Medication 975 MILLIGRAM(S): at 00:11

## 2021-06-11 RX ADMIN — Medication 600 MILLIGRAM(S): at 21:50

## 2021-06-11 RX ADMIN — Medication 600 MILLIGRAM(S): at 03:50

## 2021-06-11 RX ADMIN — HEPARIN SODIUM 5000 UNIT(S): 5000 INJECTION INTRAVENOUS; SUBCUTANEOUS at 21:50

## 2021-06-11 NOTE — PROGRESS NOTE ADULT - SUBJECTIVE AND OBJECTIVE BOX
Patient seen and examined at bedside, no acute overnight events. No acute concerns, pain well controlled. Patient is ambulating and tolerating regular diet. Has not yet passed flatus. Voiding spontaneously. Denies CP, SOB, N/V, HA, blurred vision, epigastric pain.    Vital Signs Last 24 Hours  T(C): 36.7 (06-11-21 @ 00:33), Max: 36.9 (06-10-21 @ 05:45)  HR: 69 (06-11-21 @ 00:33) (67 - 108)  BP: 108/72 (06-11-21 @ 00:33) (96/54 - 140/60)  RR: 18 (06-11-21 @ 00:33) (16 - 18)  SpO2: 99% (06-11-21 @ 00:33) (95% - 100%)    I&O's Summary    09 Jun 2021 07:01  -  10 Jace 2021 07:00  --------------------------------------------------------  IN: 5219 mL / OUT: 3600 mL / NET: 1619 mL    10 Jace 2021 07:01  -  11 Jun 2021 00:55  --------------------------------------------------------  IN: 0 mL / OUT: 2050 mL / NET: -2050 mL        Physical Exam:  General: NAD  Abdomen: Soft, non-tender, non-distended, fundus firm  Incision: Pfannensteil incision CDI, subcuticular suture closure  Pelvic: Lochia wnl    Labs:    Blood Type: O Positive  Antibody Screen: --  RPR: Negative               11.4   11.38 )-----------( 225      ( 06-09 @ 07:57 )             34.9         MEDICATIONS  (STANDING):  acetaminophen   Tablet .. 975 milliGRAM(s) Oral <User Schedule>  diphtheria/tetanus/pertussis (acellular) Vaccine (ADAcel) 0.5 milliLiter(s) IntraMuscular once  diphtheria/tetanus/pertussis (acellular) Vaccine (ADAcel) 0.5 milliLiter(s) IntraMuscular once  heparin   Injectable 5000 Unit(s) SubCutaneous every 12 hours  ibuprofen  Tablet. 600 milliGRAM(s) Oral every 6 hours  ibuprofen  Tablet. 600 milliGRAM(s) Oral every 6 hours  lactated ringers. 1000 milliLiter(s) (125 mL/Hr) IV Continuous <Continuous>  morphine PF Epidural 2 milliGRAM(s) Epidural once  oxytocin Infusion 333.333 milliUNIT(s)/Min (1000 mL/Hr) IV Continuous <Continuous>    MEDICATIONS  (PRN):  dexAMETHasone  Injectable 4 milliGRAM(s) IV Push every 6 hours PRN Nausea  diphenhydrAMINE 25 milliGRAM(s) Oral every 6 hours PRN Pruritus  diphenhydrAMINE 25 milliGRAM(s) Oral every 6 hours PRN Pruritus  lanolin Ointment 1 Application(s) Topical every 6 hours PRN Sore Nipples  magnesium hydroxide Suspension 30 milliLiter(s) Oral two times a day PRN Constipation  naloxone Injectable 0.1 milliGRAM(s) IV Push every 3 minutes PRN For ANY of the following changes in patient status:  A. Breaths Per Minute LESS THAN 10, B. Oxygen saturation LESS THAN 90%, C. Sedation score of 6 for Stop After: 4 Times  ondansetron Injectable 4 milliGRAM(s) IV Push every 6 hours PRN Nausea  oxyCODONE    IR 5 milliGRAM(s) Oral every 3 hours PRN Moderate to Severe Pain (4-10)  oxyCODONE    IR 5 milliGRAM(s) Oral once PRN Moderate to Severe Pain (4-10)  oxyCODONE    IR 5 milliGRAM(s) Oral every 3 hours PRN Moderate to Severe Pain (4-10)  oxyCODONE    IR 5 milliGRAM(s) Oral once PRN Moderate to Severe Pain (4-10)  simethicone 80 milliGRAM(s) Chew every 4 hours PRN Gas     Patient seen and examined at bedside, no acute overnight events. No acute concerns, pain well controlled. Patient is ambulating and tolerating regular diet. Has passed flatus. Voiding spontaneously. Denies CP, SOB, N/V, HA, blurred vision, epigastric pain.    Vital Signs Last 24 Hours  T(C): 36.7 (06-11-21 @ 00:33), Max: 36.9 (06-10-21 @ 05:45)  HR: 69 (06-11-21 @ 00:33) (67 - 108)  BP: 108/72 (06-11-21 @ 00:33) (96/54 - 140/60)  RR: 18 (06-11-21 @ 00:33) (16 - 18)  SpO2: 99% (06-11-21 @ 00:33) (95% - 100%)    I&O's Summary    09 Jun 2021 07:01  -  10 Jace 2021 07:00  --------------------------------------------------------  IN: 5219 mL / OUT: 3600 mL / NET: 1619 mL    10 Jace 2021 07:01  -  11 Jun 2021 00:55  --------------------------------------------------------  IN: 0 mL / OUT: 2050 mL / NET: -2050 mL        Physical Exam:  General: NAD  Abdomen: Soft, non-tender, non-distended, fundus firm  Incision: Pfannensteil incision CDI, subcuticular suture closure  Pelvic: Lochia wnl    Labs:    Blood Type: O Positive  Antibody Screen: --  RPR: Negative               11.4   11.38 )-----------( 225      ( 06-09 @ 07:57 )             34.9         MEDICATIONS  (STANDING):  acetaminophen   Tablet .. 975 milliGRAM(s) Oral <User Schedule>  diphtheria/tetanus/pertussis (acellular) Vaccine (ADAcel) 0.5 milliLiter(s) IntraMuscular once  diphtheria/tetanus/pertussis (acellular) Vaccine (ADAcel) 0.5 milliLiter(s) IntraMuscular once  heparin   Injectable 5000 Unit(s) SubCutaneous every 12 hours  ibuprofen  Tablet. 600 milliGRAM(s) Oral every 6 hours  ibuprofen  Tablet. 600 milliGRAM(s) Oral every 6 hours  lactated ringers. 1000 milliLiter(s) (125 mL/Hr) IV Continuous <Continuous>  morphine PF Epidural 2 milliGRAM(s) Epidural once  oxytocin Infusion 333.333 milliUNIT(s)/Min (1000 mL/Hr) IV Continuous <Continuous>    MEDICATIONS  (PRN):  dexAMETHasone  Injectable 4 milliGRAM(s) IV Push every 6 hours PRN Nausea  diphenhydrAMINE 25 milliGRAM(s) Oral every 6 hours PRN Pruritus  diphenhydrAMINE 25 milliGRAM(s) Oral every 6 hours PRN Pruritus  lanolin Ointment 1 Application(s) Topical every 6 hours PRN Sore Nipples  magnesium hydroxide Suspension 30 milliLiter(s) Oral two times a day PRN Constipation  naloxone Injectable 0.1 milliGRAM(s) IV Push every 3 minutes PRN For ANY of the following changes in patient status:  A. Breaths Per Minute LESS THAN 10, B. Oxygen saturation LESS THAN 90%, C. Sedation score of 6 for Stop After: 4 Times  ondansetron Injectable 4 milliGRAM(s) IV Push every 6 hours PRN Nausea  oxyCODONE    IR 5 milliGRAM(s) Oral every 3 hours PRN Moderate to Severe Pain (4-10)  oxyCODONE    IR 5 milliGRAM(s) Oral once PRN Moderate to Severe Pain (4-10)  oxyCODONE    IR 5 milliGRAM(s) Oral every 3 hours PRN Moderate to Severe Pain (4-10)  oxyCODONE    IR 5 milliGRAM(s) Oral once PRN Moderate to Severe Pain (4-10)  simethicone 80 milliGRAM(s) Chew every 4 hours PRN Gas

## 2021-06-11 NOTE — PROGRESS NOTE ADULT - ASSESSMENT
29y/o POD#1 from pLTCS for arrest (EBL=800) in stable condition.     - Continue with po analgesia  - Increase ambulation  - Continue regular diet  - d/c IV fluids  - Check CBC  - Incision dressing removed    Jovita Diop, PGY-1 31y/o POD#1 from [facility] for arrest (EBL=800) in stable condition.     - Continue with po analgesia  - Increase ambulation  - Advance to regular diet  - d/c IV fluids  - Check CBC  - Incision dressing removed    Jovita Diop, PGY-1

## 2021-06-12 ENCOUNTER — TRANSCRIPTION ENCOUNTER (OUTPATIENT)
Age: 31
End: 2021-06-12

## 2021-06-12 VITALS
TEMPERATURE: 98 F | SYSTOLIC BLOOD PRESSURE: 104 MMHG | DIASTOLIC BLOOD PRESSURE: 70 MMHG | HEART RATE: 78 BPM | RESPIRATION RATE: 18 BRPM | OXYGEN SATURATION: 97 %

## 2021-06-12 RX ORDER — SENNA PLUS 8.6 MG/1
2 TABLET ORAL AT BEDTIME
Refills: 0 | Status: DISCONTINUED | OUTPATIENT
Start: 2021-06-12 | End: 2021-06-12

## 2021-06-12 RX ORDER — HYDROCORTISONE 1 %
1 OINTMENT (GRAM) TOPICAL THREE TIMES A DAY
Refills: 0 | Status: DISCONTINUED | OUTPATIENT
Start: 2021-06-12 | End: 2021-06-12

## 2021-06-12 RX ADMIN — TETANUS TOXOID, REDUCED DIPHTHERIA TOXOID AND ACELLULAR PERTUSSIS VACCINE, ADSORBED 0.5 MILLILITER(S): 5; 2.5; 8; 8; 2.5 SUSPENSION INTRAMUSCULAR at 00:07

## 2021-06-12 RX ADMIN — Medication 600 MILLIGRAM(S): at 03:35

## 2021-06-12 RX ADMIN — Medication 600 MILLIGRAM(S): at 08:23

## 2021-06-12 RX ADMIN — Medication 975 MILLIGRAM(S): at 06:16

## 2021-06-12 RX ADMIN — Medication 975 MILLIGRAM(S): at 12:30

## 2021-06-12 RX ADMIN — Medication 600 MILLIGRAM(S): at 02:57

## 2021-06-12 RX ADMIN — Medication 975 MILLIGRAM(S): at 01:04

## 2021-06-12 RX ADMIN — Medication 600 MILLIGRAM(S): at 09:00

## 2021-06-12 RX ADMIN — HEPARIN SODIUM 5000 UNIT(S): 5000 INJECTION INTRAVENOUS; SUBCUTANEOUS at 10:31

## 2021-06-12 RX ADMIN — Medication 975 MILLIGRAM(S): at 00:07

## 2021-06-12 RX ADMIN — Medication 975 MILLIGRAM(S): at 11:53

## 2021-06-12 RX ADMIN — Medication 975 MILLIGRAM(S): at 05:39

## 2021-06-12 NOTE — DISCHARGE NOTE OB - MEDICATION SUMMARY - MEDICATIONS TO TAKE
I will START or STAY ON the medications listed below when I get home from the hospital:    ferrous sulfate  -- Indication: For Labor and delivery, indication for care    PreNata  -- Indication: For Labor and delivery, indication for care

## 2021-06-12 NOTE — PROGRESS NOTE ADULT - SUBJECTIVE AND OBJECTIVE BOX
Patient seen and examined at bedside, no acute overnight events. No acute concerns, pain well controlled. Patient is ambulating, voiding spontaneously, passing flatus, and tolerating regular diet. Denies CP, SOB, N/V, HA, blurred vision, epigastric pain.    Vital Signs Last 24 Hours  T(C): 36.5 (06-11-21 @ 12:26), Max: 36.5 (06-11-21 @ 05:04)  HR: 79 (06-11-21 @ 12:26) (79 - 79)  BP: 99/63 (06-11-21 @ 12:26) (95/60 - 99/63)  RR: 18 (06-11-21 @ 12:26) (18 - 18)  SpO2: 98% (06-11-21 @ 12:26) (98% - 98%)    Physical Exam:  General: NAD  Abdomen: Soft, non-tender, non-distended, fundus firm  Incision: Pfannensteil incision CDI, subcuticular suture closure  Pelvic: Lochia wnl    Labs:    Blood Type: O Positive  Antibody Screen: --  RPR: Negative               10.0   14.94 )-----------( 194      ( 06-11 @ 06:45 )             30.2                11.4   11.38 )-----------( 225      ( 06-09 @ 07:57 )             34.9         MEDICATIONS  (STANDING):  acetaminophen   Tablet .. 975 milliGRAM(s) Oral <User Schedule>  diphtheria/tetanus/pertussis (acellular) Vaccine (ADAcel) 0.5 milliLiter(s) IntraMuscular once  heparin   Injectable 5000 Unit(s) SubCutaneous every 12 hours  ibuprofen  Tablet. 600 milliGRAM(s) Oral every 6 hours  ibuprofen  Tablet. 600 milliGRAM(s) Oral every 6 hours  lactated ringers. 1000 milliLiter(s) (125 mL/Hr) IV Continuous <Continuous>  oxytocin Infusion 333.333 milliUNIT(s)/Min (1000 mL/Hr) IV Continuous <Continuous>    MEDICATIONS  (PRN):  dexAMETHasone  Injectable 4 milliGRAM(s) IV Push every 6 hours PRN Nausea  diphenhydrAMINE 25 milliGRAM(s) Oral every 6 hours PRN Pruritus  diphenhydrAMINE 25 milliGRAM(s) Oral every 6 hours PRN Pruritus  lanolin Ointment 1 Application(s) Topical every 6 hours PRN Sore Nipples  magnesium hydroxide Suspension 30 milliLiter(s) Oral two times a day PRN Constipation  naloxone Injectable 0.1 milliGRAM(s) IV Push every 3 minutes PRN For ANY of the following changes in patient status:  A. Breaths Per Minute LESS THAN 10, B. Oxygen saturation LESS THAN 90%, C. Sedation score of 6 for Stop After: 4 Times  ondansetron Injectable 4 milliGRAM(s) IV Push every 6 hours PRN Nausea  oxyCODONE    IR 5 milliGRAM(s) Oral every 3 hours PRN Moderate to Severe Pain (4-10)  oxyCODONE    IR 5 milliGRAM(s) Oral once PRN Moderate to Severe Pain (4-10)  oxyCODONE    IR 5 milliGRAM(s) Oral every 3 hours PRN Moderate to Severe Pain (4-10)  oxyCODONE    IR 5 milliGRAM(s) Oral once PRN Moderate to Severe Pain (4-10)  simethicone 80 milliGRAM(s) Chew every 4 hours PRN Gas       Patient seen and examined at bedside, no acute overnight events. No acute concerns, pain well controlled. Patient is ambulating, voiding spontaneously, passing flatus, and tolerating regular diet. Denies CP, SOB, N/V, HA, blurred vision, epigastric pain. Pt with itching rash she noted yesterday.    Vital Signs Last 24 Hours  T(C): 36.5 (06-11-21 @ 12:26), Max: 36.5 (06-11-21 @ 05:04)  HR: 79 (06-11-21 @ 12:26) (79 - 79)  BP: 99/63 (06-11-21 @ 12:26) (95/60 - 99/63)  RR: 18 (06-11-21 @ 12:26) (18 - 18)  SpO2: 98% (06-11-21 @ 12:26) (98% - 98%)    Physical Exam:  General: NAD  Abdomen: Soft, non-tender, non-distended, fundus firm. Raised plaque above umbilicus c/w urticaria   Incision: Pfannensteil incision CDI, subcuticular suture closure  Pelvic: Lochia wnl    Labs:    Blood Type: O Positive  Antibody Screen: --  RPR: Negative               10.0   14.94 )-----------( 194      ( 06-11 @ 06:45 )             30.2                11.4   11.38 )-----------( 225      ( 06-09 @ 07:57 )             34.9         MEDICATIONS  (STANDING):  acetaminophen   Tablet .. 975 milliGRAM(s) Oral <User Schedule>  diphtheria/tetanus/pertussis (acellular) Vaccine (ADAcel) 0.5 milliLiter(s) IntraMuscular once  heparin   Injectable 5000 Unit(s) SubCutaneous every 12 hours  ibuprofen  Tablet. 600 milliGRAM(s) Oral every 6 hours  ibuprofen  Tablet. 600 milliGRAM(s) Oral every 6 hours  lactated ringers. 1000 milliLiter(s) (125 mL/Hr) IV Continuous <Continuous>  oxytocin Infusion 333.333 milliUNIT(s)/Min (1000 mL/Hr) IV Continuous <Continuous>    MEDICATIONS  (PRN):  dexAMETHasone  Injectable 4 milliGRAM(s) IV Push every 6 hours PRN Nausea  diphenhydrAMINE 25 milliGRAM(s) Oral every 6 hours PRN Pruritus  diphenhydrAMINE 25 milliGRAM(s) Oral every 6 hours PRN Pruritus  lanolin Ointment 1 Application(s) Topical every 6 hours PRN Sore Nipples  magnesium hydroxide Suspension 30 milliLiter(s) Oral two times a day PRN Constipation  naloxone Injectable 0.1 milliGRAM(s) IV Push every 3 minutes PRN For ANY of the following changes in patient status:  A. Breaths Per Minute LESS THAN 10, B. Oxygen saturation LESS THAN 90%, C. Sedation score of 6 for Stop After: 4 Times  ondansetron Injectable 4 milliGRAM(s) IV Push every 6 hours PRN Nausea  oxyCODONE    IR 5 milliGRAM(s) Oral every 3 hours PRN Moderate to Severe Pain (4-10)  oxyCODONE    IR 5 milliGRAM(s) Oral once PRN Moderate to Severe Pain (4-10)  oxyCODONE    IR 5 milliGRAM(s) Oral every 3 hours PRN Moderate to Severe Pain (4-10)  oxyCODONE    IR 5 milliGRAM(s) Oral once PRN Moderate to Severe Pain (4-10)  simethicone 80 milliGRAM(s) Chew every 4 hours PRN Gas

## 2021-06-12 NOTE — PROGRESS NOTE ADULT - ASSESSMENT
31y/o POD#2 from pLTCS for arrest (EBL=800) in stable condition.     - Continue with po analgesia  - Increase ambulation  - Advance to regular diet    Jovita Diop, PGY-1 29y/o POD#2 from pLTCS for arrest (EBL=800) in stable condition.     - Continue with po analgesia  - Increase ambulation  - Advance to regular diet    Jovita Diop, PGY-1 31y/o POD#2 from pLTCS for arrest (EBL=800) in stable condition.     - Continue with po analgesia  - Increase ambulation  - Advance to regular diet  - Hydrocortisone for urticaria, possibly 2/2 adhesive of drapes    Jovita Diop, PGY-1

## 2021-06-12 NOTE — DISCHARGE NOTE OB - PATIENT PORTAL LINK FT
You can access the FollowMyHealth Patient Portal offered by [facility] by registering at the following website: [email or web address]. By joining [employer]’s FollowMyHealth portal, you will also be able to view your health information using other applications (apps) compatible with our system.

## 2021-06-12 NOTE — DISCHARGE NOTE OB - CARE PROVIDER_API CALL
Adryan Chin  OBSTETRICS AND GYNECOLOGY  [address], [address]  [place] [address]  Phone: [phone]  Fax: [phone]  Follow Up Time:

## 2021-06-22 PROCEDURE — 86900 BLOOD TYPING SEROLOGIC ABO: CPT

## 2021-06-22 PROCEDURE — 85025 COMPLETE CBC W/AUTO DIFF WBC: CPT

## 2021-06-22 PROCEDURE — 59025 FETAL NON-STRESS TEST: CPT

## 2021-06-22 PROCEDURE — 59050 FETAL MONITOR W/REPORT: CPT

## 2021-06-22 PROCEDURE — 86850 RBC ANTIBODY SCREEN: CPT

## 2021-06-22 PROCEDURE — C1765: CPT

## 2021-06-22 PROCEDURE — 86769 SARS-COV-2 COVID-19 ANTIBODY: CPT

## 2021-06-22 PROCEDURE — 86901 BLOOD TYPING SEROLOGIC RH(D): CPT

## 2021-06-22 PROCEDURE — 90715 TDAP VACCINE 7 YRS/> IM: CPT

## 2021-06-22 PROCEDURE — 86780 TREPONEMA PALLIDUM: CPT

## 2023-12-14 ENCOUNTER — APPOINTMENT (OUTPATIENT)
Dept: ANTEPARTUM | Facility: CLINIC | Age: 33
End: 2023-12-14
Payer: MEDICAID

## 2023-12-14 ENCOUNTER — ASOB RESULT (OUTPATIENT)
Age: 33
End: 2023-12-14

## 2023-12-14 PROCEDURE — 76811 OB US DETAILED SNGL FETUS: CPT

## 2024-01-03 ENCOUNTER — NON-APPOINTMENT (OUTPATIENT)
Age: 34
End: 2024-01-03

## 2024-04-22 ENCOUNTER — OUTPATIENT (OUTPATIENT)
Dept: OUTPATIENT SERVICES | Facility: HOSPITAL | Age: 34
LOS: 1 days | End: 2024-04-22
Payer: COMMERCIAL

## 2024-04-22 VITALS
HEIGHT: 63 IN | OXYGEN SATURATION: 98 % | HEART RATE: 92 BPM | DIASTOLIC BLOOD PRESSURE: 72 MMHG | TEMPERATURE: 98 F | SYSTOLIC BLOOD PRESSURE: 112 MMHG | WEIGHT: 169.98 LBS

## 2024-04-22 DIAGNOSIS — Z29.9 ENCOUNTER FOR PROPHYLACTIC MEASURES, UNSPECIFIED: ICD-10-CM

## 2024-04-22 DIAGNOSIS — O34.211 MATERNAL CARE FOR LOW TRANSVERSE SCAR FROM PREVIOUS CESAREAN DELIVERY: ICD-10-CM

## 2024-04-22 DIAGNOSIS — Z98.891 HISTORY OF UTERINE SCAR FROM PREVIOUS SURGERY: ICD-10-CM

## 2024-04-22 DIAGNOSIS — Z98.891 HISTORY OF UTERINE SCAR FROM PREVIOUS SURGERY: Chronic | ICD-10-CM

## 2024-04-22 DIAGNOSIS — Z01.818 ENCOUNTER FOR OTHER PREPROCEDURAL EXAMINATION: ICD-10-CM

## 2024-04-22 LAB
BLD GP AB SCN SERPL QL: NEGATIVE — SIGNIFICANT CHANGE UP
HCT VFR BLD CALC: 32.8 % — LOW (ref 34.5–45)
HGB BLD-MCNC: 10.5 G/DL — LOW (ref 11.5–15.5)
MCHC RBC-ENTMCNC: 26 PG — LOW (ref 27–34)
MCHC RBC-ENTMCNC: 32 GM/DL — SIGNIFICANT CHANGE UP (ref 32–36)
MCV RBC AUTO: 81.2 FL — SIGNIFICANT CHANGE UP (ref 80–100)
NRBC # BLD: 0 /100 WBCS — SIGNIFICANT CHANGE UP (ref 0–0)
PLATELET # BLD AUTO: 223 K/UL — SIGNIFICANT CHANGE UP (ref 150–400)
RBC # BLD: 4.04 M/UL — SIGNIFICANT CHANGE UP (ref 3.8–5.2)
RBC # FLD: 13.2 % — SIGNIFICANT CHANGE UP (ref 10.3–14.5)
RH IG SCN BLD-IMP: POSITIVE — SIGNIFICANT CHANGE UP
WBC # BLD: 11.43 K/UL — HIGH (ref 3.8–10.5)
WBC # FLD AUTO: 11.43 K/UL — HIGH (ref 3.8–10.5)

## 2024-04-22 PROCEDURE — 86901 BLOOD TYPING SEROLOGIC RH(D): CPT

## 2024-04-22 PROCEDURE — G0463: CPT

## 2024-04-22 PROCEDURE — 86850 RBC ANTIBODY SCREEN: CPT

## 2024-04-22 PROCEDURE — 36415 COLL VENOUS BLD VENIPUNCTURE: CPT

## 2024-04-22 PROCEDURE — 85027 COMPLETE CBC AUTOMATED: CPT

## 2024-04-22 PROCEDURE — 86900 BLOOD TYPING SEROLOGIC ABO: CPT

## 2024-04-22 RX ORDER — OXYTOCIN 10 UNIT/ML
333.33 VIAL (ML) INJECTION
Qty: 20 | Refills: 0 | Status: DISCONTINUED | OUTPATIENT
Start: 2024-04-26 | End: 2024-04-28

## 2024-04-22 RX ORDER — CHLORHEXIDINE GLUCONATE 213 G/1000ML
1 SOLUTION TOPICAL DAILY
Refills: 0 | Status: DISCONTINUED | OUTPATIENT
Start: 2024-04-26 | End: 2024-04-26

## 2024-04-22 RX ORDER — FERROUS SULFATE 325(65) MG
0 TABLET ORAL
Refills: 0 | DISCHARGE

## 2024-04-22 RX ORDER — FERROUS SULFATE 325(65) MG
0 TABLET ORAL
Qty: 0 | Refills: 0 | DISCHARGE

## 2024-04-22 NOTE — OB PST NOTE - HISTORY OF PRESENT ILLNESS
34 yo female, PMH  presents to PST for scheduled Repear C-Secction on 4/26/24  nausea at first trimester,  now heartburn, constipation 32 yo female, no significant PMH s/p   and presents to PST for scheduled Repeat  on 24. Denies recent fever, chills, chest pain, SOB, changes in bowel/urinary habits or recent exposure to COVID-19 infection. Pt. denies clotting or bleeding disorders.

## 2024-04-22 NOTE — OB PST NOTE - NSHPREVIEWOFSYSTEMS_GEN_ALL_CORE
REVIEW OF SYSTEMS:  CONSTITUTIONAL: No weakness, fevers or chills  EYES/ENT: No visual changes;  No vertigo or throat pain   NECK: No pain or stiffness  RESPIRATORY: denies SOB  CARDIOVASCULAR: No chest pain or palpitations, mild swelling of feet and hands  GASTROINTESTINAL: denies nausea and diarrhea  GENITOURINARY: No dysuria, frequency or hematuria  NEUROLOGICAL: No numbness or weakness  SKIN: No itching, rashes

## 2024-04-22 NOTE — OB PST NOTE - NSHPPHYSICALEXAM_GEN_ALL_CORE
PHYSICAL EXAMINATION:  General Appearance: well-developed, well-nourished female in no distress.   HEENT: Normocephalic.  Eyes: Conjunctiva pink. PERRLA. Neck: Supple. No lymphadenopathy. Thyroid: No thyromegaly or masses.  Chest: Clear to auscultation  Heart: Regular, S1, S2  Abdomen: Soft, nontender. Normoactive bowel sounds.  Extremities: Trace pedal and hands edema, no cyanosis  Skin: No rashes

## 2024-04-22 NOTE — OB PST NOTE - ASSESSMENT
TAVOI VTE 2.0 SCORE [CLOT updated 2019]    AGE RELATED RISK FACTORS                                                       MOBILITY RELATED FACTORS  [ ] Age 41-60 years                                            (1 Point)                    [ ] Bed rest                                                        (1 Point)  [ ] Age: 61-74 years                                           (2 Points)                  [ ] Plaster cast                                                   (2 Points)  [ ] Age= 75 years                                              (3 Points)                    [ ] Bed bound for more than 72 hours                 (2 Points)    DISEASE RELATED RISK FACTORS                                               GENDER SPECIFIC FACTORS  [ x] Edema in the lower extremities                       (1 Point)              [ ] Pregnancy                                                     (1 Point)  [ ] Varicose veins                                               (1 Point)                     [ ] Post-partum < 6 weeks                                   (1 Point)             [x ] BMI > 25 Kg/m2                                            (1 Point)                     [ ] Hormonal therapy  or oral contraception          (1 Point)                 [ ] Sepsis (in the previous month)                        (1 Point)               [ ] History of pregnancy complications                 (1 point)  [ ] Pneumonia or serious lung disease                                               [ ] Unexplained or recurrent                     (1 Point)           (in the previous month)                               (1 Point)  [ ] Abnormal pulmonary function test                     (1 Point)                 SURGERY RELATED RISK FACTORS  [ ] Acute myocardial infarction                              (1 Point)               [x ]  Section                                             (1 Point)  [ ] Congestive heart failure (in the previous month)  (1 Point)      [ ] Minor surgery                                                  (1 Point)   [ ] Inflammatory bowel disease                             (1 Point)               [ ] Arthroscopic surgery                                        (2 Points)  [ ] Central venous access                                      (2 Points)                [ ] General surgery lasting more than 45 minutes (2 points)  [ ] Malignancy- Present or previous                   (2 Points)                [ ] Elective arthroplasty                                         (5 points)    [ ] Stroke (in the previous month)                          (5 Points)                                                                                                                                                           HEMATOLOGY RELATED FACTORS                                                 TRAUMA RELATED RISK FACTORS  [ ] Prior episodes of VTE                                     (3 Points)                [ ] Fracture of the hip, pelvis, or leg                       (5 Points)  [ ] Positive family history for VTE                         (3 Points)             [ ] Acute spinal cord injury (in the previous month)  (5 Points)  [ ] Prothrombin 93484 A                                     (3 Points)               [ ] Paralysis  (less than 1 month)                             (5 Points)  [ ] Factor V Leiden                                             (3 Points)                  [ ] Multiple Trauma within 1 month                        (5 Points)  [ ] Lupus anticoagulants                                     (3 Points)                                                           [ ] Anticardiolipin antibodies                               (3 Points)                                                       [ ] High homocysteine in the blood                      (3 Points)                                             [ ] Other congenital or acquired thrombophilia      (3 Points)                                                [ ] Heparin induced thrombocytopenia                  (3 Points)                                     Total Score [       3   ]

## 2024-04-22 NOTE — OB PST NOTE - NS_OBGYNHISTORY_OBGYN_ALL_OB_FT
LMP 23, JACQUES 24  [id number],  6/10/21  now girl again ,  1/10/21, LMP 23, JACQUES 24, scheduled for  24, Maternal + GBS

## 2024-04-22 NOTE — OB PST NOTE - PROBLEM SELECTOR PLAN 1
Repeat  on 24  Pre-op instructions, including Chlorhexidine soap and ERP hydration instructions provided - all questions answered  CBC and T&S done at PST  MMF documents printed and in chart

## 2024-04-25 ENCOUNTER — TRANSCRIPTION ENCOUNTER (OUTPATIENT)
Age: 34
End: 2024-04-25

## 2024-04-26 ENCOUNTER — INPATIENT (INPATIENT)
Facility: HOSPITAL | Age: 34
LOS: 1 days | Discharge: HOME | DRG: 833 | End: 2024-04-28
Attending: OBSTETRICS & GYNECOLOGY | Admitting: OBSTETRICS & GYNECOLOGY
Payer: COMMERCIAL

## 2024-04-26 VITALS
TEMPERATURE: 98 F | WEIGHT: 169.98 LBS | DIASTOLIC BLOOD PRESSURE: 66 MMHG | HEIGHT: 63 IN | SYSTOLIC BLOOD PRESSURE: 116 MMHG | RESPIRATION RATE: 18 BRPM | HEART RATE: 88 BPM | OXYGEN SATURATION: 99 %

## 2024-04-26 DIAGNOSIS — O34.211 MATERNAL CARE FOR LOW TRANSVERSE SCAR FROM PREVIOUS CESAREAN DELIVERY: ICD-10-CM

## 2024-04-26 DIAGNOSIS — Z98.891 HISTORY OF UTERINE SCAR FROM PREVIOUS SURGERY: Chronic | ICD-10-CM

## 2024-04-26 LAB — T PALLIDUM AB TITR SER: NEGATIVE — SIGNIFICANT CHANGE UP

## 2024-04-26 PROCEDURE — 88307 TISSUE EXAM BY PATHOLOGIST: CPT | Mod: 26

## 2024-04-26 DEVICE — SEPRAFILM 5 X 6": Type: IMPLANTABLE DEVICE | Status: FUNCTIONAL

## 2024-04-26 RX ORDER — NALBUPHINE HYDROCHLORIDE 10 MG/ML
2.5 INJECTION, SOLUTION INTRAMUSCULAR; INTRAVENOUS; SUBCUTANEOUS EVERY 6 HOURS
Refills: 0 | Status: DISCONTINUED | OUTPATIENT
Start: 2024-04-26 | End: 2024-04-27

## 2024-04-26 RX ORDER — INFLUENZA VIRUS VACCINE 15; 15; 15; 15 UG/.5ML; UG/.5ML; UG/.5ML; UG/.5ML
0.5 SUSPENSION INTRAMUSCULAR ONCE
Refills: 0 | Status: DISCONTINUED | OUTPATIENT
Start: 2024-04-26 | End: 2024-04-28

## 2024-04-26 RX ORDER — NALOXONE HYDROCHLORIDE 4 MG/.1ML
0.1 SPRAY NASAL
Refills: 0 | Status: DISCONTINUED | OUTPATIENT
Start: 2024-04-26 | End: 2024-04-27

## 2024-04-26 RX ORDER — MAGNESIUM HYDROXIDE 400 MG/1
30 TABLET, CHEWABLE ORAL
Refills: 0 | Status: DISCONTINUED | OUTPATIENT
Start: 2024-04-26 | End: 2024-04-28

## 2024-04-26 RX ORDER — OXYCODONE HYDROCHLORIDE 5 MG/1
10 TABLET ORAL
Refills: 0 | Status: DISCONTINUED | OUTPATIENT
Start: 2024-04-26 | End: 2024-04-27

## 2024-04-26 RX ORDER — SODIUM CHLORIDE 9 MG/ML
1000 INJECTION, SOLUTION INTRAVENOUS
Refills: 0 | Status: DISCONTINUED | OUTPATIENT
Start: 2024-04-26 | End: 2024-04-26

## 2024-04-26 RX ORDER — MORPHINE SULFATE 50 MG/1
0.1 CAPSULE, EXTENDED RELEASE ORAL ONCE
Refills: 0 | Status: DISCONTINUED | OUTPATIENT
Start: 2024-04-26 | End: 2024-04-27

## 2024-04-26 RX ORDER — OXYCODONE HYDROCHLORIDE 5 MG/1
5 TABLET ORAL
Refills: 0 | Status: DISCONTINUED | OUTPATIENT
Start: 2024-04-26 | End: 2024-04-27

## 2024-04-26 RX ORDER — OXYCODONE HYDROCHLORIDE 5 MG/1
5 TABLET ORAL
Refills: 0 | Status: DISCONTINUED | OUTPATIENT
Start: 2024-04-26 | End: 2024-04-28

## 2024-04-26 RX ORDER — ONDANSETRON 8 MG/1
4 TABLET, FILM COATED ORAL EVERY 6 HOURS
Refills: 0 | Status: DISCONTINUED | OUTPATIENT
Start: 2024-04-26 | End: 2024-04-27

## 2024-04-26 RX ORDER — KETOROLAC TROMETHAMINE 30 MG/ML
30 SYRINGE (ML) INJECTION EVERY 6 HOURS
Refills: 0 | Status: DISCONTINUED | OUTPATIENT
Start: 2024-04-26 | End: 2024-04-28

## 2024-04-26 RX ORDER — TETANUS TOXOID, REDUCED DIPHTHERIA TOXOID AND ACELLULAR PERTUSSIS VACCINE, ADSORBED 5; 2.5; 8; 8; 2.5 [IU]/.5ML; [IU]/.5ML; UG/.5ML; UG/.5ML; UG/.5ML
0.5 SUSPENSION INTRAMUSCULAR ONCE
Refills: 0 | Status: DISCONTINUED | OUTPATIENT
Start: 2024-04-26 | End: 2024-04-28

## 2024-04-26 RX ORDER — CITRIC ACID/SODIUM CITRATE 300-500 MG
15 SOLUTION, ORAL ORAL ONCE
Refills: 0 | Status: COMPLETED | OUTPATIENT
Start: 2024-04-26 | End: 2024-04-26

## 2024-04-26 RX ORDER — LANOLIN
1 OINTMENT (GRAM) TOPICAL EVERY 6 HOURS
Refills: 0 | Status: DISCONTINUED | OUTPATIENT
Start: 2024-04-26 | End: 2024-04-28

## 2024-04-26 RX ORDER — DEXAMETHASONE 0.5 MG/5ML
4 ELIXIR ORAL EVERY 6 HOURS
Refills: 0 | Status: DISCONTINUED | OUTPATIENT
Start: 2024-04-26 | End: 2024-04-27

## 2024-04-26 RX ORDER — OXYCODONE HYDROCHLORIDE 5 MG/1
5 TABLET ORAL ONCE
Refills: 0 | Status: DISCONTINUED | OUTPATIENT
Start: 2024-04-26 | End: 2024-04-28

## 2024-04-26 RX ORDER — HEPARIN SODIUM 5000 [USP'U]/ML
5000 INJECTION INTRAVENOUS; SUBCUTANEOUS EVERY 12 HOURS
Refills: 0 | Status: DISCONTINUED | OUTPATIENT
Start: 2024-04-26 | End: 2024-04-28

## 2024-04-26 RX ORDER — OXYTOCIN 10 UNIT/ML
VIAL (ML) INJECTION
Qty: 20 | Refills: 0 | Status: DISCONTINUED | OUTPATIENT
Start: 2024-04-26 | End: 2024-04-28

## 2024-04-26 RX ORDER — ACETAMINOPHEN 500 MG
975 TABLET ORAL
Refills: 0 | Status: DISCONTINUED | OUTPATIENT
Start: 2024-04-26 | End: 2024-04-28

## 2024-04-26 RX ORDER — FAMOTIDINE 10 MG/ML
20 INJECTION INTRAVENOUS ONCE
Refills: 0 | Status: COMPLETED | OUTPATIENT
Start: 2024-04-26 | End: 2024-04-26

## 2024-04-26 RX ORDER — SIMETHICONE 80 MG/1
80 TABLET, CHEWABLE ORAL EVERY 4 HOURS
Refills: 0 | Status: DISCONTINUED | OUTPATIENT
Start: 2024-04-26 | End: 2024-04-28

## 2024-04-26 RX ORDER — DIPHENHYDRAMINE HCL 50 MG
25 CAPSULE ORAL EVERY 6 HOURS
Refills: 0 | Status: DISCONTINUED | OUTPATIENT
Start: 2024-04-26 | End: 2024-04-28

## 2024-04-26 RX ORDER — ACETAMINOPHEN 500 MG
1000 TABLET ORAL ONCE
Refills: 0 | Status: COMPLETED | OUTPATIENT
Start: 2024-04-26 | End: 2024-04-26

## 2024-04-26 RX ORDER — IBUPROFEN 200 MG
600 TABLET ORAL EVERY 6 HOURS
Refills: 0 | Status: COMPLETED | OUTPATIENT
Start: 2024-04-26 | End: 2025-03-25

## 2024-04-26 RX ORDER — SODIUM CHLORIDE 9 MG/ML
1000 INJECTION, SOLUTION INTRAVENOUS
Refills: 0 | Status: DISCONTINUED | OUTPATIENT
Start: 2024-04-26 | End: 2024-04-28

## 2024-04-26 RX ADMIN — Medication 400 MILLIGRAM(S): at 14:43

## 2024-04-26 RX ADMIN — Medication 975 MILLIGRAM(S): at 20:54

## 2024-04-26 RX ADMIN — HEPARIN SODIUM 5000 UNIT(S): 5000 INJECTION INTRAVENOUS; SUBCUTANEOUS at 18:10

## 2024-04-26 RX ADMIN — Medication 975 MILLIGRAM(S): at 20:21

## 2024-04-26 RX ADMIN — FAMOTIDINE 20 MILLIGRAM(S): 10 INJECTION INTRAVENOUS at 10:03

## 2024-04-26 RX ADMIN — Medication 30 MILLIGRAM(S): at 23:45

## 2024-04-26 RX ADMIN — Medication 30 MILLIGRAM(S): at 18:10

## 2024-04-26 RX ADMIN — Medication 15 MILLILITER(S): at 10:03

## 2024-04-26 NOTE — OB PROVIDER H&P - NSHPREVIEWOFSYSTEMS_GEN_ALL_CORE
ICU Vital Signs Last 24 Hrs  T(C): 36.9 (26 Apr 2024 08:40), Max: 36.9 (26 Apr 2024 08:40)  T(F): 98.42 (26 Apr 2024 08:40), Max: 98.42 (26 Apr 2024 08:40)  HR: 87 (26 Apr 2024 09:08) (86 - 90)  BP: 116/66 (26 Apr 2024 09:03) (116/66 - 116/66)  BP(mean): --  ABP: --  ABP(mean): --  RR: 18 (26 Apr 2024 08:40) (18 - 18)  SpO2: 97% (26 Apr 2024 09:08) (97% - 99%)    O2 Parameters below as of 26 Apr 2024 08:31  Patient On (Oxygen Delivery Method): room air    gen: A&Ox3  CV: rrr s1s2  abd: gravid soft  VE: deferred  EFM: 135 moderate variability, + acels, -decels  toco: none

## 2024-04-26 NOTE — OB PROVIDER H&P - HISTORY OF PRESENT ILLNESS
History and Physical    The patient is a 34 y/o  EDC 2024 @ 39.1 weeks admitted for a scheduled rLTCS.  The patient denies LOF, VB, contx. endorses good fetal movement. The patient accepts all blood products    allergies: nkda  meds: PNV, Iron supplements    GBS: (3/30/2024): positive  EFW: 6 lbs 6 weeks prior  PNC: (2023): Low Lying posterior placenta 1.1cm @ 20 weeks , follow up at 28 weeks    Medhx: pt denies cardiac, pulm, heme, GI, neuro  OBhx: 2020 pLTCS female FTP 8 lbs 2oz uncomplicated  Sxhx: pt denies  Gynhx: pt denies cysts, fibroids, abn. pap, STDs  Sochx: pt denies  Famhx: pt denies

## 2024-04-26 NOTE — OB RN INTRAOPERATIVE NOTE - NSSELHIDDEN_OBGYN_ALL_OB_FT
[NS_DeliveryAttending1_OBGYN_ALL_OB_FT:[id number]==],[NS_DeliveryAssist1_OBGYN_ALL_OB_FT:[id number]],[NS_DeliveryRN_OBGYN_ALL_OB_FT:[id number]]

## 2024-04-26 NOTE — OB RN DELIVERY SUMMARY - NSSELHIDDEN_OBGYN_ALL_OB_FT
[NS_DeliveryAttending1_OBGYN_ALL_OB_FT:[id number]==],[NS_DeliveryAssist1_OBGYN_ALL_OB_FT:[id number]=],[NS_DeliveryRN_OBGYN_ALL_OB_FT:[id number]==]

## 2024-04-26 NOTE — OB RN DELIVERY SUMMARY - NS_SEPSISRSKCALC_OBGYN_ALL_OB_FT
EOS calculated successfully. EOS Risk Factor: 0.5/1000 live births ([facility] [place] [facility] [place] national incidence[place] GA=39w1d; Temp=98.42; ROM=0; GBS='Negative'; Antibiotics='No antibiotics or any antibiotics < 2 hrs prior to birth'

## 2024-04-26 NOTE — OB RN PATIENT PROFILE - SOURCE OF INFORMATION, OB PROFILE

## 2024-04-26 NOTE — PRE-ANESTHESIA EVALUATION ADULT - NSANTHPMHFT_GEN_ALL_CORE
V/S stable; Denies SOB, wheezing, cough, palpitations, CP.   METS > 4  Denies spinal surgery, N/V. HA, blurry vision, RUQ pain, asthma, HTN, bleeding disorders. V/S stable; Denies SOB, wheezing, cough, palpitations, CP.   METS > 4  Denies spinal surgery, N/V. HA, blurry vision, RUQ pain, asthma, HTN, bleeding disorders.  s/p primary C/S 2021 (FTP) epidural w/o cx's ([facility])

## 2024-04-26 NOTE — OB RN INTRAOPERATIVE NOTE - NS_ELECTROSURGICALUNITBIOMEDNUMBER_OBGYN_ALL_OB_FT
You were seen in the emergency department for pain with or difficulty urinating. While you were here, your physical exam and vital signs were reassuring. You had a pelvic ultrasound that was also reassuring. We tested your urine, which showed that you do have a urinary tract infection (or UTI). You are now being discharged.    You have been given a prescription for an antibiotic called Keflex to treat your UTI. Please take this medicine as prescribed, and be sure to take the entire course even if you start to feel better. We recommend that you stay well hydrated and drink plenty of fluids.     Finally, we recommend that you follow up with your primary care doctor who can monitor your symptoms. Your doctor can arrange for a repeat urine test to make sure that the infection was successfully treated. Your doctor can also monitor your unusual vaginal bleeding, and can refer you to a specialist if needed.    Symptoms can always change. Please call your doctor or return to the emergency department:  * if you develop a fever  * if you develop new or worsening abdominal pain  * if you develop new or worsening side or flank pain  * if you have uncontrollable nausea or vomiting  * if you are unable to urinate  * if you continue to have symptoms after 48 hours  * if you notice any new or concerning symptoms  
[id number]

## 2024-04-26 NOTE — OB PROVIDER H&P - ASSESSMENT
32 y/o  @ 39.1 weeks admitted for a scheduled rLTCS  -admit to L&D  -routine labs  -NPO bicitra  -EFM/toco  -IV hydration  -ancef  -anesthesia consult  -anticipated c/s    Dr. Chin aware of scheduled surgery  Norma Copeland NP

## 2024-04-26 NOTE — OB RN INTRAOPERATIVE NOTE - NS_FROZENSECTION_OBGYN_ALL_OB
Complex Case Management Deferred 90 days      Date/Time:  1/19/2023 11:46 AM    Method of communication with patient:phone    LPN Care Coordinator(LPN CC) attempted contact the patient by telephone to perform Ambulatory Care Coordination. For three unsuccessful calls. Care Coordination Services calls will be suspended for 90 days. Letter mailed to patient. No

## 2024-04-27 LAB
BASOPHILS # BLD AUTO: 0.02 K/UL — SIGNIFICANT CHANGE UP (ref 0–0.2)
BASOPHILS NFR BLD AUTO: 0.2 % — SIGNIFICANT CHANGE UP (ref 0–2)
EOSINOPHIL # BLD AUTO: 0.03 K/UL — SIGNIFICANT CHANGE UP (ref 0–0.5)
EOSINOPHIL NFR BLD AUTO: 0.2 % — SIGNIFICANT CHANGE UP (ref 0–6)
HCT VFR BLD CALC: 28.2 % — LOW (ref 34.5–45)
HGB BLD-MCNC: 9 G/DL — LOW (ref 11.5–15.5)
IMM GRANULOCYTES NFR BLD AUTO: 0.6 % — SIGNIFICANT CHANGE UP (ref 0–0.9)
LYMPHOCYTES # BLD AUTO: 18.7 % — SIGNIFICANT CHANGE UP (ref 13–44)
LYMPHOCYTES # BLD AUTO: 2.46 K/UL — SIGNIFICANT CHANGE UP (ref 1–3.3)
MCHC RBC-ENTMCNC: 25.8 PG — LOW (ref 27–34)
MCHC RBC-ENTMCNC: 31.9 GM/DL — LOW (ref 32–36)
MCV RBC AUTO: 80.8 FL — SIGNIFICANT CHANGE UP (ref 80–100)
MONOCYTES # BLD AUTO: 0.86 K/UL — SIGNIFICANT CHANGE UP (ref 0–0.9)
MONOCYTES NFR BLD AUTO: 6.5 % — SIGNIFICANT CHANGE UP (ref 2–14)
NEUTROPHILS # BLD AUTO: 9.74 K/UL — HIGH (ref 1.8–7.4)
NEUTROPHILS NFR BLD AUTO: 73.8 % — SIGNIFICANT CHANGE UP (ref 43–77)
NRBC # BLD: 0 /100 WBCS — SIGNIFICANT CHANGE UP (ref 0–0)
PLATELET # BLD AUTO: 190 K/UL — SIGNIFICANT CHANGE UP (ref 150–400)
RBC # BLD: 3.49 M/UL — LOW (ref 3.8–5.2)
RBC # FLD: 13.5 % — SIGNIFICANT CHANGE UP (ref 10.3–14.5)
WBC # BLD: 13.19 K/UL — HIGH (ref 3.8–10.5)
WBC # FLD AUTO: 13.19 K/UL — HIGH (ref 3.8–10.5)

## 2024-04-27 RX ADMIN — Medication 30 MILLIGRAM(S): at 00:15

## 2024-04-27 RX ADMIN — Medication 30 MILLIGRAM(S): at 12:29

## 2024-04-27 RX ADMIN — Medication 975 MILLIGRAM(S): at 15:12

## 2024-04-27 RX ADMIN — Medication 30 MILLIGRAM(S): at 17:28

## 2024-04-27 RX ADMIN — Medication 975 MILLIGRAM(S): at 02:51

## 2024-04-27 RX ADMIN — Medication 30 MILLIGRAM(S): at 23:20

## 2024-04-27 RX ADMIN — HEPARIN SODIUM 5000 UNIT(S): 5000 INJECTION INTRAVENOUS; SUBCUTANEOUS at 17:28

## 2024-04-27 RX ADMIN — Medication 975 MILLIGRAM(S): at 08:21

## 2024-04-27 RX ADMIN — Medication 975 MILLIGRAM(S): at 20:45

## 2024-04-27 RX ADMIN — Medication 30 MILLIGRAM(S): at 23:50

## 2024-04-27 RX ADMIN — SIMETHICONE 80 MILLIGRAM(S): 80 TABLET, CHEWABLE ORAL at 20:13

## 2024-04-27 RX ADMIN — Medication 30 MILLIGRAM(S): at 05:48

## 2024-04-27 RX ADMIN — Medication 30 MILLIGRAM(S): at 17:58

## 2024-04-27 RX ADMIN — Medication 975 MILLIGRAM(S): at 02:21

## 2024-04-27 RX ADMIN — Medication 975 MILLIGRAM(S): at 20:13

## 2024-04-27 RX ADMIN — HEPARIN SODIUM 5000 UNIT(S): 5000 INJECTION INTRAVENOUS; SUBCUTANEOUS at 05:18

## 2024-04-27 RX ADMIN — Medication 30 MILLIGRAM(S): at 11:59

## 2024-04-27 RX ADMIN — Medication 30 MILLIGRAM(S): at 05:18

## 2024-04-27 RX ADMIN — Medication 975 MILLIGRAM(S): at 15:42

## 2024-04-27 RX ADMIN — SIMETHICONE 80 MILLIGRAM(S): 80 TABLET, CHEWABLE ORAL at 15:14

## 2024-04-27 RX ADMIN — Medication 975 MILLIGRAM(S): at 08:51

## 2024-04-28 ENCOUNTER — TRANSCRIPTION ENCOUNTER (OUTPATIENT)
Age: 34
End: 2024-04-28

## 2024-04-28 VITALS
OXYGEN SATURATION: 96 % | RESPIRATION RATE: 18 BRPM | SYSTOLIC BLOOD PRESSURE: 102 MMHG | DIASTOLIC BLOOD PRESSURE: 63 MMHG | HEART RATE: 84 BPM | TEMPERATURE: 98 F

## 2024-04-28 PROCEDURE — 59025 FETAL NON-STRESS TEST: CPT

## 2024-04-28 PROCEDURE — 86901 BLOOD TYPING SEROLOGIC RH(D): CPT

## 2024-04-28 PROCEDURE — 36415 COLL VENOUS BLD VENIPUNCTURE: CPT

## 2024-04-28 PROCEDURE — 85025 COMPLETE CBC W/AUTO DIFF WBC: CPT

## 2024-04-28 PROCEDURE — C1765: CPT

## 2024-04-28 PROCEDURE — 86850 RBC ANTIBODY SCREEN: CPT

## 2024-04-28 PROCEDURE — 86900 BLOOD TYPING SEROLOGIC ABO: CPT

## 2024-04-28 PROCEDURE — 59050 FETAL MONITOR W/REPORT: CPT

## 2024-04-28 PROCEDURE — 86780 TREPONEMA PALLIDUM: CPT

## 2024-04-28 PROCEDURE — 88307 TISSUE EXAM BY PATHOLOGIST: CPT

## 2024-04-28 RX ORDER — IBUPROFEN 200 MG
600 TABLET ORAL EVERY 6 HOURS
Refills: 0 | Status: DISCONTINUED | OUTPATIENT
Start: 2024-04-28 | End: 2024-04-28

## 2024-04-28 RX ADMIN — Medication 975 MILLIGRAM(S): at 08:46

## 2024-04-28 RX ADMIN — Medication 975 MILLIGRAM(S): at 03:10

## 2024-04-28 RX ADMIN — Medication 600 MILLIGRAM(S): at 12:09

## 2024-04-28 RX ADMIN — Medication 600 MILLIGRAM(S): at 11:39

## 2024-04-28 RX ADMIN — HEPARIN SODIUM 5000 UNIT(S): 5000 INJECTION INTRAVENOUS; SUBCUTANEOUS at 05:32

## 2024-04-28 RX ADMIN — Medication 975 MILLIGRAM(S): at 02:39

## 2024-04-28 RX ADMIN — Medication 30 MILLIGRAM(S): at 06:02

## 2024-04-28 RX ADMIN — Medication 975 MILLIGRAM(S): at 09:16

## 2024-04-28 RX ADMIN — Medication 30 MILLIGRAM(S): at 05:32

## 2024-04-28 NOTE — DISCHARGE NOTE OB - MEDICATION SUMMARY - MEDICATIONS TO TAKE
I will START or STAY ON the medications listed below when I get home from the hospital:    Pre-dg vitamins  -- 1 tab by mouth once a day  -- Indication: For S/P     Iron  -- 1 tab by mouth onc a day  -- Indication: For S/P

## 2024-04-28 NOTE — PROGRESS NOTE ADULT - ASSESSMENT
34yo POD#1 uncomplicated rLTCS progressing well in te postpartum state     #Postpartum state  - Continue with po analgesia  - Increase ambulation  - Continue regular diet  - H/H 10.5/32.8->9.0/28.2  - DVT prophylaxis with 5000u heparin    Dulce Rodgers PGY-1

## 2024-04-28 NOTE — DISCHARGE NOTE OB - NS MD DC FALL RISK RISK
For information on Fall & Injury Prevention, visit: [email or web address] OR  [email or web address] OR  https://www.cdc.gov/steadi/patient.html

## 2024-04-28 NOTE — DISCHARGE NOTE OB - CARE PROVIDER_API CALL
Adryan Chin  Obstetrics and Gynecology  [address], [address]  [place] [address]  Phone: [phone]  Fax: [phone]  Follow Up Time:

## 2024-04-28 NOTE — PROGRESS NOTE ADULT - SUBJECTIVE AND OBJECTIVE BOX
R1 Progress Note    Patient seen and examined at bedside, no acute overnight events. No acute complaints, pain well controlled. Patient is ambulating, voiding, and tolerating regular diet. Passing flatus. Denies CP, SOB, N/V, HA, blurred vision, epigastric pain.    Vital Signs Last 24 Hours  T(C): 36.6 (04-27-24 @ 21:36), Max: 36.7 (04-27-24 @ 06:16)  HR: 90 (04-27-24 @ 21:36) (73 - 90)  BP: 102/69 (04-27-24 @ 21:36) (96/66 - 107/70)  RR: 18 (04-27-24 @ 21:36) (18 - 18)  SpO2: 98% (04-27-24 @ 21:36) (96% - 98%)    I&O's Summary    26 Apr 2024 07:01  -  27 Apr 2024 07:00  --------------------------------------------------------  IN: 1855.2 mL / OUT: 2867 mL / NET: -1011.8 mL        Physical Exam:  General: NAD  Abdomen: Soft, non-tender, non-distended, fundus firm  Incision: Pfannenstiel incision CDI, subcuticular suture closure  Pelvic: Lochia wnl    Labs:    Blood Type: O Positive  Antibody Screen: Negative  RPR: Negative               9.0    13.19 )-----------( 190      ( 04-27 @ 06:26 )             28.2                10.5   11.43 )-----------( 223      ( 04-22 @ 16:08 )             32.8         MEDICATIONS  (STANDING):  acetaminophen     Tablet .. 975 milliGRAM(s) Oral <User Schedule>  diphtheria/tetanus/pertussis (acellular) Vaccine (Adacel) 0.5 milliLiter(s) IntraMuscular once  heparin   Injectable 5000 Unit(s) SubCutaneous every 12 hours  ibuprofen  Tablet. 600 milliGRAM(s) Oral every 6 hours  influenza   Vaccine 0.5 milliLiter(s) IntraMuscular once  ketorolac   Injectable 30 milliGRAM(s) IV Push every 6 hours  lactated ringers. 1000 milliLiter(s) (125 mL/Hr) IV Continuous <Continuous>  oxytocin Infusion 333.333 milliUNIT(s)/Min (1000 mL/Hr) IV Continuous <Continuous>  oxytocin Infusion  milliUNIT(s)/Min (1000 mL/Hr) IV Continuous <Continuous>    MEDICATIONS  (PRN):  diphenhydrAMINE 25 milliGRAM(s) Oral every 6 hours PRN Pruritus  lanolin Ointment 1 Application(s) Topical every 6 hours PRN Sore Nipples  magnesium hydroxide Suspension 30 milliLiter(s) Oral two times a day PRN Constipation  oxyCODONE    IR 5 milliGRAM(s) Oral once PRN Moderate to Severe Pain (4-10)  oxyCODONE    IR 5 milliGRAM(s) Oral every 3 hours PRN Moderate to Severe Pain (4-10)  simethicone 80 milliGRAM(s) Chew every 4 hours PRN Gas  
Day 1 of Anesthesia Pain Management Service    SUBJECTIVE:  Pain Scale Score:          [X] Refer to charted pain scores    THERAPY:    s/p ___mg PF morphine    MEDICATIONS  (STANDING):  acetaminophen     Tablet .. 975 milliGRAM(s) Oral <User Schedule>  diphtheria/tetanus/pertussis (acellular) Vaccine (Adacel) 0.5 milliLiter(s) IntraMuscular once  heparin   Injectable 5000 Unit(s) SubCutaneous every 12 hours  ibuprofen  Tablet. 600 milliGRAM(s) Oral every 6 hours  influenza   Vaccine 0.5 milliLiter(s) IntraMuscular once  ketorolac   Injectable 30 milliGRAM(s) IV Push every 6 hours  lactated ringers. 1000 milliLiter(s) (125 mL/Hr) IV Continuous <Continuous>  oxytocin Infusion 333.333 milliUNIT(s)/Min (1000 mL/Hr) IV Continuous <Continuous>  oxytocin Infusion  milliUNIT(s)/Min (1000 mL/Hr) IV Continuous <Continuous>    MEDICATIONS  (PRN):  diphenhydrAMINE 25 milliGRAM(s) Oral every 6 hours PRN Pruritus  lanolin Ointment 1 Application(s) Topical every 6 hours PRN Sore Nipples  magnesium hydroxide Suspension 30 milliLiter(s) Oral two times a day PRN Constipation  oxyCODONE    IR 5 milliGRAM(s) Oral every 3 hours PRN Moderate to Severe Pain (4-10)  oxyCODONE    IR 5 milliGRAM(s) Oral once PRN Moderate to Severe Pain (4-10)  simethicone 80 milliGRAM(s) Chew every 4 hours PRN Gas      OBJECTIVE:    Sedation:        	[X] Alert	[ ] Drowsy	[ ] Arousable      [ ] Asleep       [ ] Unresponsive    Side Effects:	[X] None	[ ] Nausea	[ ] Vomiting         [ ] Pruritus  		[ ] Weakness            [ ] Numbness	          [ ] Other:    Vital Signs Last 24 Hrs  T(C): 36.7 (27 Apr 2024 12:22), Max: 36.8 (26 Apr 2024 15:15)  T(F): 98.1 (27 Apr 2024 12:22), Max: 98.2 (26 Apr 2024 15:15)  HR: 81 (27 Apr 2024 12:22) (64 - 86)  BP: 96/66 (27 Apr 2024 12:22) (94/60 - 120/67)  BP(mean): 85 (26 Apr 2024 14:15) (85 - 89)  RR: 18 (27 Apr 2024 12:22) (18 - 18)  SpO2: 98% (27 Apr 2024 12:22) (96% - 99%)    Parameters below as of 27 Apr 2024 12:22  Patient On (Oxygen Delivery Method): room air        ASSESSMENT/ PLAN  [X] Patient transitioned to prn analgesics  [X] Pain management per primary service, pain service to sign off   [X]Documentation and Verification of current medications
PT SEEN AND EXAMINED.  VSS. AFEBRILE.  FUNDUS FIRM, NT  LOCHIA MODERATE  INCISION C/D/I  EXT NO SIGN OF DVT  - CONTINUE LIQUID DIET UNTIL +FLATUS  - INCREASED OOB ENCOURAGED

## 2024-04-28 NOTE — DISCHARGE NOTE OB - PATIENT PORTAL LINK FT
You can access the FollowMyHealth Patient Portal offered by [facility] by registering at the following website: [email or web address]. By joining [employer]’s FollowMyHealth portal, you will also be able to view your health information using other applications (apps) compatible with our system.

## 2024-05-06 ENCOUNTER — APPOINTMENT (OUTPATIENT)
Dept: PEDIATRICS | Facility: CLINIC | Age: 34
End: 2024-05-06
Payer: MEDICAID

## 2024-05-06 PROCEDURE — 99213 OFFICE O/P EST LOW 20 MIN: CPT

## 2024-05-25 LAB — SURGICAL PATHOLOGY STUDY: SIGNIFICANT CHANGE UP

## 2024-11-08 NOTE — OB PROVIDER DELIVERY SUMMARY - NSCSINDICATIONA_OBGYN_ALL_OB
N/A Patient is under age 18 and does not have a history of high risk behavior or is not high risk for Hep C Arrest of Dilatation